# Patient Record
Sex: FEMALE | Race: WHITE | NOT HISPANIC OR LATINO | Employment: FULL TIME | URBAN - METROPOLITAN AREA
[De-identification: names, ages, dates, MRNs, and addresses within clinical notes are randomized per-mention and may not be internally consistent; named-entity substitution may affect disease eponyms.]

---

## 2023-05-11 ENCOUNTER — PATIENT ROUNDING (BHMG ONLY) (OUTPATIENT)
Dept: INTERNAL MEDICINE | Age: 48
End: 2023-05-11
Payer: COMMERCIAL

## 2023-05-11 ENCOUNTER — OFFICE VISIT (OUTPATIENT)
Dept: INTERNAL MEDICINE | Age: 48
End: 2023-05-11
Payer: COMMERCIAL

## 2023-05-11 VITALS
BODY MASS INDEX: 46.33 KG/M2 | OXYGEN SATURATION: 96 % | DIASTOLIC BLOOD PRESSURE: 82 MMHG | TEMPERATURE: 98.4 F | SYSTOLIC BLOOD PRESSURE: 116 MMHG | HEIGHT: 61 IN | WEIGHT: 245.4 LBS | HEART RATE: 72 BPM

## 2023-05-11 DIAGNOSIS — Z00.00 ENCOUNTER FOR MEDICAL EXAMINATION TO ESTABLISH CARE: ICD-10-CM

## 2023-05-11 DIAGNOSIS — I10 PRIMARY HYPERTENSION: ICD-10-CM

## 2023-05-11 DIAGNOSIS — M51.16 RADICULOPATHY DUE TO DISORDER OF INTERVERTEBRAL DISC OF LUMBAR SPINE: ICD-10-CM

## 2023-05-11 DIAGNOSIS — E55.9 VITAMIN D DEFICIENCY: ICD-10-CM

## 2023-05-11 DIAGNOSIS — F33.0 MILD EPISODE OF RECURRENT MAJOR DEPRESSIVE DISORDER: ICD-10-CM

## 2023-05-11 DIAGNOSIS — F31.9 BIPOLAR 1 DISORDER: ICD-10-CM

## 2023-05-11 DIAGNOSIS — L60.0 INGROWING NAIL, RIGHT GREAT TOE: Primary | ICD-10-CM

## 2023-05-11 DIAGNOSIS — E66.01 MORBID OBESITY WITH BMI OF 45.0-49.9, ADULT: ICD-10-CM

## 2023-05-11 DIAGNOSIS — J30.2 SEASONAL ALLERGIES: ICD-10-CM

## 2023-05-11 PROBLEM — F32.9 MAJOR DEPRESSIVE DISORDER: Status: ACTIVE | Noted: 2023-05-11

## 2023-05-11 RX ORDER — ERGOCALCIFEROL 1.25 MG/1
50000 CAPSULE ORAL WEEKLY
COMMUNITY

## 2023-05-11 RX ORDER — BENAZEPRIL HYDROCHLORIDE 5 MG/1
40 TABLET, FILM COATED ORAL DAILY
COMMUNITY

## 2023-05-11 RX ORDER — DICLOFENAC SODIUM 75 MG/1
TABLET, DELAYED RELEASE ORAL
COMMUNITY
Start: 2023-05-01

## 2023-05-11 RX ORDER — HYDROCHLOROTHIAZIDE 12.5 MG/1
12.5 CAPSULE, GELATIN COATED ORAL DAILY
COMMUNITY

## 2023-05-11 RX ORDER — VENLAFAXINE 25 MG/1
25 TABLET ORAL 2 TIMES DAILY
COMMUNITY

## 2023-05-11 RX ORDER — MONTELUKAST SODIUM 10 MG/1
10 TABLET ORAL NIGHTLY
COMMUNITY

## 2023-05-11 RX ORDER — AMLODIPINE BESYLATE 10 MG/1
TABLET ORAL
COMMUNITY
Start: 2023-05-01

## 2023-05-11 RX ORDER — GABAPENTIN 400 MG/1
CAPSULE ORAL
COMMUNITY
Start: 2023-05-01

## 2023-05-11 NOTE — PROGRESS NOTES
I N T E R N A L  M E D I C I N E  Stefanie Oscar, APRN    ENCOUNTER DATE:  05/11/2023    Ana Reddy / 48 y.o. / female      CHIEF COMPLAINT / REASON FOR OFFICE VISIT     Establish Care, Toe Pain (Right big toe, nail has been removed prior but has grown back the same way, nail is growing into second toe), Back Pain, and Leg Pain      ASSESSMENT & PLAN     Diagnoses and all orders for this visit:    1. Ingrowing nail, right great toe (Primary)  Assessment & Plan:  Referral to Podiatry for trimming       2. Radiculopathy due to disorder of intervertebral disc of lumbar spine  Assessment & Plan:  Patient on Gabapentin 400 mg daily for peripheral neuropathy, and currently filled and prescribed by her Pain MD in [place]. Referral to Pain Management here in [place]    Orders:  -     Ambulatory Referral to Pain Management Clinic    3. Primary hypertension  Assessment & Plan:  Hypertension is stable. Continue Amlodipine 10 mg daily, Benzaepril 40 mg daily, and hydrochlorothiazide 12.5 mg daily   Continue to loose weight, exercise at least 30 minutes 3 days a week, and make healthy food choices.     Orders:  -     CBC w AUTO Differential  -     Comprehensive metabolic panel    4. Bipolar 1 disorder  Assessment & Plan:  Continue Venlafaxine 25 mg twice daily and prescribed and managed by her Psychiatrist in [place]      5. Vitamin D deficiency  Assessment & Plan:  Continue Vitamin D 50,000 units once weekly      6. Mild episode of recurrent major depressive disorder  -     Ambulatory Referral to Psychiatry    7. Morbid obesity with BMI of 45.0-49.9, adult  Assessment & Plan:  Patient's (Body mass index is 46.37 kg/m².) indicates that they are morbidly/severely obese (BMI > 40 or > 35 with obesity - related health condition) with health conditions that include hypertension . Weight is newly identified. BMI  is above average; BMI management plan is completed. We discussed low calorie, low carb based diet program,  "portion control, increasing exercise, joining a fitness center or start home based exercise program and Weight Watchers or other Commercial based weight reduction program.     Orders:  -     Lipid panel  -     Hemoglobin A1c  -     TSH+Free T4    8. Seasonal allergies  Assessment & Plan:  Continue Montelukast 10 mg daily      9. Encounter for medical examination to establish care  -     Ambulatory Referral to Obstetrics / Gynecology  -     Ambulatory Referral to Podiatry      Return in about 6 months (around 11/11/2023) for Next scheduled follow up.      VITAL SIGNS     Visit Vitals  /82 (BP Location: Left arm, Patient Position: Sitting, Cuff Size: Large Adult)   Pulse 72   Temp 98.4 °F (36.9 °C) (Temporal)   Ht 154.9 cm (61\")   Wt 111 kg (245 lb 6.4 oz)   SpO2 96%   BMI 46.37 kg/m²           BP Readings from Last 3 Encounters:   05/11/23 116/82     Wt Readings from Last 3 Encounters:   05/11/23 111 kg (245 lb 6.4 oz)     Body mass index is 46.37 kg/m².    Blood pressure readings recorded on patient flowsheet:       View : No data to display.                     MEDICATIONS AT THE TIME OF OFFICE VISIT     Current Outpatient Medications on File Prior to Visit   Medication Sig Dispense Refill   • amLODIPine (NORVASC) 10 MG tablet      • benazepril (LOTENSIN) 5 MG tablet Take 8 tablets by mouth Daily.     • Cetirizine HCl 10 MG capsule Take 10 mg by mouth Daily.     • diclofenac (VOLTAREN) 75 MG EC tablet      • gabapentin (NEURONTIN) 400 MG capsule      • hydroCHLOROthiazide (MICROZIDE) 12.5 MG capsule Take 1 capsule by mouth Daily.     • montelukast (SINGULAIR) 10 MG tablet Take 1 tablet by mouth Every Night.     • venlafaxine (EFFEXOR) 25 MG tablet Take 1 tablet by mouth 2 (Two) Times a Day.     • vitamin D (ERGOCALCIFEROL) 1.25 MG (87332 UT) capsule capsule Take 1 capsule by mouth 1 (One) Time Per Week.       No current facility-administered medications on file prior to visit.        HISTORY OF PRESENT " ILLNESS   48 year old female being seen today to establish care with a New PCP, former PCP Dr Lam Sims, in [place], and [place] PCP [facility]. No previous medical records available to review. PMH includes Seasonal allergies, Hypertension, Vitamin D Deficiency, Morbid Obesity, Bipolar 1 Disorder, Major Depressive disorder, Radiculopathy due to disorder of intervertebral disc of lumbar spine, Ingrowing nail, right great toe.   Patient states that she had her right great toenail removed previously and now it has regrown sideways and rubbing against her second toe causing some discomfort but no open areas noted. Patient states that she would like a referral to a Podiatrist for treatment.   Patient states has chronic lumbar radiculopathy related to bulging discs and does not want back surgery. Patient is currently on Gabapentin and states has been helping and requesting referral to Pain management for treatment.   Hypertension is stable on current medication regimen. Patient states that she is compliant with her medications. 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   Bipolar is a chronic disorder and currently stable on Venlafaxine. Patient states that she had a Psychiatrist in [place] and would like one here.     Health Maintenance: Patient due for her annual mammogram, states had a hysterectomy. Patient also due for TDap.       Patient Care Team:  Stefanie Oscar APRN as PCP - General (Family Medicine)    REVIEW OF SYSTEMS     Review of Systems   Constitutional: Negative for activity change, appetite change, chills and fever.   Respiratory: Negative for cough, chest tightness and shortness of breath.    Cardiovascular: Negative for chest pain, palpitations and leg swelling.   Gastrointestinal: Negative for abdominal distention, abdominal pain, constipation, diarrhea, nausea and vomiting.    Musculoskeletal: Positive for arthralgias and myalgias.   Neurological: Negative for dizziness and headaches.   Psychiatric/Behavioral: Negative.  Negative for self-injury and suicidal ideas.          PHYSICAL EXAMINATION     Physical Exam  Constitutional:       General: She is not in acute distress.  Cardiovascular:      Rate and Rhythm: Normal rate and regular rhythm.      Pulses: Normal pulses.      Heart sounds: Normal heart sounds.   Pulmonary:      Effort: Pulmonary effort is normal. No respiratory distress.      Breath sounds: Normal breath sounds.   Abdominal:      General: Bowel sounds are normal. There is no distension.      Palpations: Abdomen is soft.      Tenderness: There is no abdominal tenderness.   Musculoskeletal:         General: No swelling or tenderness. Normal range of motion.      Cervical back: Normal range of motion and neck supple. No rigidity or tenderness.      Comments: Numbness and tingling in bilateral extremities with mild lower lumbar tenderness    Skin:     General: Skin is warm and dry.   Neurological:      Mental Status: She is alert and oriented to person, place, and time. Mental status is at baseline.      Cranial Nerves: No cranial nerve deficit.   Psychiatric:         Mood and Affect: Mood normal.         Behavior: Behavior normal.         Thought Content: Thought content normal.         Judgment: Judgment normal.             REVIEWED DATA     Labs:     No results found for: NA, K, CALCIUM, AST, ALT, BUN, CREATININE, EGFRIFNONA, EGFRIFAFRI    No results found for: HGBA1C    No results found for: LDL, HDL, TRIG    No results found for: TSH, FREET4    No results found for: WBC, HGB, PLT    No results found for: MALBCRERATIO         Imaging:           Medical Tests:           Summary of old records / correspondence / consultant report:           Request outside records:           MARGARET Ni

## 2023-05-11 NOTE — PROGRESS NOTES
A My-Chart message has been sent to the patient [place] [facility] [place]

## 2023-05-11 NOTE — ASSESSMENT & PLAN NOTE
Patient's (Body mass index is 46.37 kg/m².) indicates that they are morbidly/severely obese (BMI > 40 or > 35 with obesity - related health condition) with health conditions that include hypertension . Weight is newly identified. BMI  is above average; BMI management plan is completed. We discussed low calorie, low carb based diet program, portion control, increasing exercise, joining a fitness center or start home based exercise program and Weight Watchers or other Commercial based weight reduction program.

## 2023-05-11 NOTE — ASSESSMENT & PLAN NOTE
Hypertension is stable. Continue Amlodipine 10 mg daily, Benzaepril 40 mg daily, and hydrochlorothiazide 12.5 mg daily   Continue to loose weight, exercise at least 30 minutes 3 days a week, and make healthy food choices.

## 2023-05-11 NOTE — ASSESSMENT & PLAN NOTE
Patient on Gabapentin 400 mg daily for peripheral neuropathy, and currently filled and prescribed by her Pain MD in [place]. Referral to Pain Management here in [place]

## 2023-05-12 LAB
ALBUMIN SERPL-MCNC: 4.2 G/DL (ref 3.5–5.2)
ALBUMIN/GLOB SERPL: 1.5 G/DL
ALP SERPL-CCNC: 66 U/L (ref 39–117)
ALT SERPL-CCNC: 20 U/L (ref 1–33)
AST SERPL-CCNC: 19 U/L (ref 1–32)
BASOPHILS # BLD AUTO: 0.07 10*3/MM3 (ref 0–0.2)
BASOPHILS NFR BLD AUTO: 1.2 % (ref 0–1.5)
BILIRUB SERPL-MCNC: 0.2 MG/DL (ref 0–1.2)
BUN SERPL-MCNC: 20 MG/DL (ref 6–20)
BUN/CREAT SERPL: 25 (ref 7–25)
CALCIUM SERPL-MCNC: 9.4 MG/DL (ref 8.6–10.5)
CHLORIDE SERPL-SCNC: 101 MMOL/L (ref 98–107)
CHOLEST SERPL-MCNC: 208 MG/DL (ref 0–200)
CO2 SERPL-SCNC: 28.4 MMOL/L (ref 22–29)
CREAT SERPL-MCNC: 0.8 MG/DL (ref 0.57–1)
EGFRCR SERPLBLD CKD-EPI 2021: 90.5 ML/MIN/1.73
EOSINOPHIL # BLD AUTO: 0.33 10*3/MM3 (ref 0–0.4)
EOSINOPHIL NFR BLD AUTO: 5.7 % (ref 0.3–6.2)
ERYTHROCYTE [DISTWIDTH] IN BLOOD BY AUTOMATED COUNT: 13.6 % (ref 12.3–15.4)
GLOBULIN SER CALC-MCNC: 2.8 GM/DL
GLUCOSE SERPL-MCNC: 103 MG/DL (ref 65–99)
HBA1C MFR BLD: 5.7 % (ref 4.8–5.6)
HCT VFR BLD AUTO: 34.5 % (ref 34–46.6)
HDLC SERPL-MCNC: 43 MG/DL (ref 40–60)
HGB BLD-MCNC: 11.6 G/DL (ref 12–15.9)
IMM GRANULOCYTES # BLD AUTO: 0.01 10*3/MM3 (ref 0–0.05)
IMM GRANULOCYTES NFR BLD AUTO: 0.2 % (ref 0–0.5)
LDLC SERPL CALC-MCNC: 145 MG/DL (ref 0–100)
LYMPHOCYTES # BLD AUTO: 1.55 10*3/MM3 (ref 0.7–3.1)
LYMPHOCYTES NFR BLD AUTO: 26.9 % (ref 19.6–45.3)
MCH RBC QN AUTO: 29.4 PG (ref 26.6–33)
MCHC RBC AUTO-ENTMCNC: 33.6 G/DL (ref 31.5–35.7)
MCV RBC AUTO: 87.3 FL (ref 79–97)
MONOCYTES # BLD AUTO: 0.49 10*3/MM3 (ref 0.1–0.9)
MONOCYTES NFR BLD AUTO: 8.5 % (ref 5–12)
NEUTROPHILS # BLD AUTO: 3.32 10*3/MM3 (ref 1.7–7)
NEUTROPHILS NFR BLD AUTO: 57.5 % (ref 42.7–76)
NRBC BLD AUTO-RTO: 0 /100 WBC (ref 0–0.2)
PLATELET # BLD AUTO: 307 10*3/MM3 (ref 140–450)
POTASSIUM SERPL-SCNC: 4 MMOL/L (ref 3.5–5.2)
PROT SERPL-MCNC: 7 G/DL (ref 6–8.5)
RBC # BLD AUTO: 3.95 10*6/MM3 (ref 3.77–5.28)
SODIUM SERPL-SCNC: 139 MMOL/L (ref 136–145)
T4 FREE SERPL-MCNC: 1.13 NG/DL (ref 0.93–1.7)
TRIGL SERPL-MCNC: 112 MG/DL (ref 0–150)
TSH SERPL DL<=0.005 MIU/L-ACNC: 3.21 UIU/ML (ref 0.27–4.2)
VLDLC SERPL CALC-MCNC: 20 MG/DL (ref 5–40)
WBC # BLD AUTO: 5.77 10*3/MM3 (ref 3.4–10.8)

## 2023-05-17 DIAGNOSIS — M51.16 RADICULOPATHY DUE TO DISORDER OF INTERVERTEBRAL DISC OF LUMBAR SPINE: Primary | ICD-10-CM

## 2023-05-17 RX ORDER — ATORVASTATIN CALCIUM 10 MG/1
10 TABLET, FILM COATED ORAL DAILY
Qty: 90 TABLET | Refills: 2 | Status: ON HOLD | OUTPATIENT
Start: 2023-05-17

## 2023-05-17 RX ORDER — METFORMIN HYDROCHLORIDE 500 MG/1
500 TABLET, EXTENDED RELEASE ORAL
Qty: 90 TABLET | Refills: 2 | Status: ON HOLD | OUTPATIENT
Start: 2023-05-17

## 2023-05-22 ENCOUNTER — HOSPITAL ENCOUNTER (OUTPATIENT)
Facility: HOSPITAL | Age: 48
Setting detail: OBSERVATION
Discharge: HOME | End: 2023-05-23
Attending: EMERGENCY MEDICINE | Admitting: EMERGENCY MEDICINE
Payer: COMMERCIAL

## 2023-05-22 ENCOUNTER — APPOINTMENT (OUTPATIENT)
Dept: GENERAL RADIOLOGY | Facility: HOSPITAL | Age: 48
End: 2023-05-22
Payer: COMMERCIAL

## 2023-05-22 DIAGNOSIS — E66.9 OBESITY, UNSPECIFIED CLASSIFICATION, UNSPECIFIED OBESITY TYPE, UNSPECIFIED WHETHER SERIOUS COMORBIDITY PRESENT: ICD-10-CM

## 2023-05-22 DIAGNOSIS — Z86.39 HISTORY OF DIABETES MELLITUS: ICD-10-CM

## 2023-05-22 DIAGNOSIS — I10 ELEVATED BLOOD PRESSURE READING IN OFFICE WITH DIAGNOSIS OF HYPERTENSION: ICD-10-CM

## 2023-05-22 DIAGNOSIS — I20.8 ANGINAL EQUIVALENT: Primary | ICD-10-CM

## 2023-05-22 PROBLEM — R07.89 CHEST DISCOMFORT: Status: ACTIVE | Noted: 2023-05-22

## 2023-05-22 LAB
ALBUMIN SERPL-MCNC: 4.4 G/DL (ref 3.5–5.2)
ALBUMIN/GLOB SERPL: 1.4 G/DL
ALP SERPL-CCNC: 74 U/L (ref 39–117)
ALT SERPL W P-5'-P-CCNC: 23 U/L (ref 1–33)
AMORPH URATE CRY URNS QL MICRO: NORMAL /HPF
ANION GAP SERPL CALCULATED.3IONS-SCNC: 9.6 MMOL/L (ref 5–15)
AST SERPL-CCNC: 19 U/L (ref 1–32)
BACTERIA UR QL AUTO: NORMAL /HPF
BASOPHILS # BLD AUTO: 0.06 10*3/MM3 (ref 0–0.2)
BASOPHILS NFR BLD AUTO: 1 % (ref 0–1.5)
BILIRUB SERPL-MCNC: 0.2 MG/DL (ref 0–1.2)
BILIRUB UR QL STRIP: NEGATIVE
BUN SERPL-MCNC: 20 MG/DL (ref 6–20)
BUN/CREAT SERPL: 23 (ref 7–25)
CALCIUM SPEC-SCNC: 9.6 MG/DL (ref 8.6–10.5)
CHLORIDE SERPL-SCNC: 102 MMOL/L (ref 98–107)
CLARITY UR: CLEAR
CO2 SERPL-SCNC: 26.4 MMOL/L (ref 22–29)
COLOR UR: YELLOW
CREAT SERPL-MCNC: 0.87 MG/DL (ref 0.57–1)
DEPRECATED RDW RBC AUTO: 42.8 FL (ref 37–54)
EGFRCR SERPLBLD CKD-EPI 2021: 81.8 ML/MIN/1.73
EOSINOPHIL # BLD AUTO: 0.23 10*3/MM3 (ref 0–0.4)
EOSINOPHIL NFR BLD AUTO: 3.7 % (ref 0.3–6.2)
ERYTHROCYTE [DISTWIDTH] IN BLOOD BY AUTOMATED COUNT: 13.5 % (ref 12.3–15.4)
GLOBULIN UR ELPH-MCNC: 3.2 GM/DL
GLUCOSE SERPL-MCNC: 113 MG/DL (ref 65–99)
GLUCOSE UR STRIP-MCNC: NEGATIVE MG/DL
HCT VFR BLD AUTO: 33.4 % (ref 34–46.6)
HGB BLD-MCNC: 11.2 G/DL (ref 12–15.9)
HGB UR QL STRIP.AUTO: ABNORMAL
HOLD SPECIMEN: NORMAL
HOLD SPECIMEN: NORMAL
HYALINE CASTS UR QL AUTO: NORMAL /LPF
IMM GRANULOCYTES # BLD AUTO: 0.01 10*3/MM3 (ref 0–0.05)
IMM GRANULOCYTES NFR BLD AUTO: 0.2 % (ref 0–0.5)
KETONES UR QL STRIP: NEGATIVE
LEUKOCYTE ESTERASE UR QL STRIP.AUTO: NEGATIVE
LYMPHOCYTES # BLD AUTO: 1.51 10*3/MM3 (ref 0.7–3.1)
LYMPHOCYTES NFR BLD AUTO: 24.2 % (ref 19.6–45.3)
MAGNESIUM SERPL-MCNC: 1.8 MG/DL (ref 1.6–2.6)
MCH RBC QN AUTO: 29 PG (ref 26.6–33)
MCHC RBC AUTO-ENTMCNC: 33.5 G/DL (ref 31.5–35.7)
MCV RBC AUTO: 86.5 FL (ref 79–97)
MONOCYTES # BLD AUTO: 0.39 10*3/MM3 (ref 0.1–0.9)
MONOCYTES NFR BLD AUTO: 6.3 % (ref 5–12)
NEUTROPHILS NFR BLD AUTO: 4.04 10*3/MM3 (ref 1.7–7)
NEUTROPHILS NFR BLD AUTO: 64.6 % (ref 42.7–76)
NITRITE UR QL STRIP: NEGATIVE
NRBC BLD AUTO-RTO: 0 /100 WBC (ref 0–0.2)
PH UR STRIP.AUTO: 5.5 [PH] (ref 5–8)
PLATELET # BLD AUTO: 325 10*3/MM3 (ref 140–450)
PMV BLD AUTO: 10 FL (ref 6–12)
POTASSIUM SERPL-SCNC: 3.6 MMOL/L (ref 3.5–5.2)
PROT SERPL-MCNC: 7.6 G/DL (ref 6–8.5)
PROT UR QL STRIP: NEGATIVE
QT INTERVAL: 405 MS
RBC # BLD AUTO: 3.86 10*6/MM3 (ref 3.77–5.28)
RBC # UR STRIP: NORMAL /HPF
REF LAB TEST METHOD: NORMAL
SODIUM SERPL-SCNC: 138 MMOL/L (ref 136–145)
SP GR UR STRIP: >=1.03 (ref 1–1.03)
SQUAMOUS #/AREA URNS HPF: NORMAL /HPF
TROPONIN T SERPL HS-MCNC: <6 NG/L
TROPONIN T SERPL HS-MCNC: <6 NG/L
UROBILINOGEN UR QL STRIP: ABNORMAL
WBC # UR STRIP: NORMAL /HPF
WBC NRBC COR # BLD: 6.24 10*3/MM3 (ref 3.4–10.8)
WHOLE BLOOD HOLD COAG: NORMAL
WHOLE BLOOD HOLD SPECIMEN: NORMAL

## 2023-05-22 PROCEDURE — 83735 ASSAY OF MAGNESIUM: CPT

## 2023-05-22 PROCEDURE — 84484 ASSAY OF TROPONIN QUANT: CPT

## 2023-05-22 PROCEDURE — 99284 EMERGENCY DEPT VISIT MOD MDM: CPT

## 2023-05-22 PROCEDURE — 80053 COMPREHEN METABOLIC PANEL: CPT

## 2023-05-22 PROCEDURE — 85025 COMPLETE CBC W/AUTO DIFF WBC: CPT

## 2023-05-22 PROCEDURE — 93005 ELECTROCARDIOGRAM TRACING: CPT

## 2023-05-22 PROCEDURE — 71045 X-RAY EXAM CHEST 1 VIEW: CPT

## 2023-05-22 PROCEDURE — G0378 HOSPITAL OBSERVATION PER HR: HCPCS

## 2023-05-22 PROCEDURE — 93010 ELECTROCARDIOGRAM REPORT: CPT | Performed by: INTERNAL MEDICINE

## 2023-05-22 PROCEDURE — 36415 COLL VENOUS BLD VENIPUNCTURE: CPT

## 2023-05-22 PROCEDURE — 81001 URINALYSIS AUTO W/SCOPE: CPT | Performed by: EMERGENCY MEDICINE

## 2023-05-22 PROCEDURE — 93005 ELECTROCARDIOGRAM TRACING: CPT | Performed by: EMERGENCY MEDICINE

## 2023-05-22 PROCEDURE — 84484 ASSAY OF TROPONIN QUANT: CPT | Performed by: STUDENT IN AN ORGANIZED HEALTH CARE EDUCATION/TRAINING PROGRAM

## 2023-05-22 RX ORDER — HYDROCHLOROTHIAZIDE 12.5 MG/1
12.5 TABLET ORAL DAILY
Status: DISCONTINUED | OUTPATIENT
Start: 2023-05-23 | End: 2023-05-23 | Stop reason: HOSPADM

## 2023-05-22 RX ORDER — BISACODYL 5 MG/1
5 TABLET, DELAYED RELEASE ORAL DAILY PRN
Status: DISCONTINUED | OUTPATIENT
Start: 2023-05-22 | End: 2023-05-23 | Stop reason: HOSPADM

## 2023-05-22 RX ORDER — ATORVASTATIN CALCIUM 10 MG/1
10 TABLET, FILM COATED ORAL DAILY
Status: DISCONTINUED | OUTPATIENT
Start: 2023-05-23 | End: 2023-05-23 | Stop reason: HOSPADM

## 2023-05-22 RX ORDER — ONDANSETRON 4 MG/1
4 TABLET, FILM COATED ORAL EVERY 6 HOURS PRN
Status: DISCONTINUED | OUTPATIENT
Start: 2023-05-22 | End: 2023-05-23 | Stop reason: HOSPADM

## 2023-05-22 RX ORDER — BISACODYL 10 MG
10 SUPPOSITORY, RECTAL RECTAL DAILY PRN
Status: DISCONTINUED | OUTPATIENT
Start: 2023-05-22 | End: 2023-05-23 | Stop reason: HOSPADM

## 2023-05-22 RX ORDER — NITROGLYCERIN 0.4 MG/1
0.4 TABLET SUBLINGUAL
Status: DISCONTINUED | OUTPATIENT
Start: 2023-05-22 | End: 2023-05-23 | Stop reason: HOSPADM

## 2023-05-22 RX ORDER — ACETAMINOPHEN 325 MG/1
650 TABLET ORAL EVERY 4 HOURS PRN
Status: DISCONTINUED | OUTPATIENT
Start: 2023-05-22 | End: 2023-05-23 | Stop reason: HOSPADM

## 2023-05-22 RX ORDER — AMLODIPINE BESYLATE 10 MG/1
10 TABLET ORAL DAILY
Status: DISCONTINUED | OUTPATIENT
Start: 2023-05-23 | End: 2023-05-23 | Stop reason: HOSPADM

## 2023-05-22 RX ORDER — POTASSIUM CHLORIDE 750 MG/1
10 TABLET, EXTENDED RELEASE ORAL DAILY
COMMUNITY

## 2023-05-22 RX ORDER — MONTELUKAST SODIUM 10 MG/1
10 TABLET ORAL NIGHTLY
Status: DISCONTINUED | OUTPATIENT
Start: 2023-05-23 | End: 2023-05-23 | Stop reason: HOSPADM

## 2023-05-22 RX ORDER — AMOXICILLIN 250 MG
2 CAPSULE ORAL 2 TIMES DAILY
Status: DISCONTINUED | OUTPATIENT
Start: 2023-05-23 | End: 2023-05-23 | Stop reason: HOSPADM

## 2023-05-22 RX ORDER — LISINOPRIL 10 MG/1
10 TABLET ORAL
Status: DISCONTINUED | OUTPATIENT
Start: 2023-05-23 | End: 2023-05-23 | Stop reason: HOSPADM

## 2023-05-22 RX ORDER — SODIUM CHLORIDE 9 MG/ML
40 INJECTION, SOLUTION INTRAVENOUS AS NEEDED
Status: DISCONTINUED | OUTPATIENT
Start: 2023-05-22 | End: 2023-05-23 | Stop reason: HOSPADM

## 2023-05-22 RX ORDER — SODIUM CHLORIDE 0.9 % (FLUSH) 0.9 %
10 SYRINGE (ML) INJECTION AS NEEDED
Status: DISCONTINUED | OUTPATIENT
Start: 2023-05-22 | End: 2023-05-23 | Stop reason: HOSPADM

## 2023-05-22 RX ORDER — SODIUM CHLORIDE 0.9 % (FLUSH) 0.9 %
10 SYRINGE (ML) INJECTION EVERY 12 HOURS SCHEDULED
Status: DISCONTINUED | OUTPATIENT
Start: 2023-05-23 | End: 2023-05-23 | Stop reason: HOSPADM

## 2023-05-22 RX ORDER — POLYETHYLENE GLYCOL 3350 17 G/17G
17 POWDER, FOR SOLUTION ORAL DAILY PRN
Status: DISCONTINUED | OUTPATIENT
Start: 2023-05-22 | End: 2023-05-23 | Stop reason: HOSPADM

## 2023-05-22 RX ORDER — POTASSIUM CHLORIDE 750 MG/1
10 TABLET, FILM COATED, EXTENDED RELEASE ORAL DAILY
Status: DISCONTINUED | OUTPATIENT
Start: 2023-05-23 | End: 2023-05-23 | Stop reason: HOSPADM

## 2023-05-22 RX ORDER — ONDANSETRON 2 MG/ML
4 INJECTION INTRAMUSCULAR; INTRAVENOUS EVERY 6 HOURS PRN
Status: DISCONTINUED | OUTPATIENT
Start: 2023-05-22 | End: 2023-05-23 | Stop reason: HOSPADM

## 2023-05-22 RX ORDER — GABAPENTIN 400 MG/1
400 CAPSULE ORAL EVERY 12 HOURS SCHEDULED
Status: DISCONTINUED | OUTPATIENT
Start: 2023-05-23 | End: 2023-05-23 | Stop reason: HOSPADM

## 2023-05-22 RX ORDER — CETIRIZINE HYDROCHLORIDE 10 MG/1
10 TABLET ORAL DAILY
Status: DISCONTINUED | OUTPATIENT
Start: 2023-05-23 | End: 2023-05-23 | Stop reason: HOSPADM

## 2023-05-22 RX ORDER — CHOLECALCIFEROL (VITAMIN D3) 125 MCG
5 CAPSULE ORAL NIGHTLY PRN
Status: DISCONTINUED | OUTPATIENT
Start: 2023-05-22 | End: 2023-05-23 | Stop reason: HOSPADM

## 2023-05-22 RX ORDER — HYDROXYZINE HYDROCHLORIDE 25 MG/1
25 TABLET, FILM COATED ORAL 3 TIMES DAILY PRN
Qty: 5 TABLET | Refills: 0 | Status: SHIPPED | OUTPATIENT
Start: 2023-05-22

## 2023-05-22 RX ADMIN — DOCUSATE SODIUM 50MG AND SENNOSIDES 8.6MG 2 TABLET: 8.6; 5 TABLET, FILM COATED ORAL at 23:33

## 2023-05-22 RX ADMIN — MONTELUKAST SODIUM 10 MG: 10 TABLET, FILM COATED ORAL at 23:33

## 2023-05-22 RX ADMIN — GABAPENTIN 400 MG: 400 CAPSULE ORAL at 23:33

## 2023-05-22 RX ADMIN — Medication 10 ML: at 23:34

## 2023-05-22 NOTE — ED NOTES
"Patient c/o right arm and right shoulder pain that has been intermittent since earlier today. Also does \"not feel right\" c/o lightheadedness.  "

## 2023-05-23 ENCOUNTER — READMISSION MANAGEMENT (OUTPATIENT)
Dept: CALL CENTER | Facility: HOSPITAL | Age: 48
End: 2023-05-23
Payer: COMMERCIAL

## 2023-05-23 ENCOUNTER — HOSPITAL ENCOUNTER (OUTPATIENT)
Dept: CARDIOLOGY | Facility: HOSPITAL | Age: 48
Discharge: HOME | End: 2023-05-23
Payer: COMMERCIAL

## 2023-05-23 VITALS
HEART RATE: 62 BPM | DIASTOLIC BLOOD PRESSURE: 83 MMHG | OXYGEN SATURATION: 96 % | RESPIRATION RATE: 16 BRPM | SYSTOLIC BLOOD PRESSURE: 145 MMHG | BODY MASS INDEX: 47.37 KG/M2 | TEMPERATURE: 97.7 F | WEIGHT: 250.9 LBS | HEIGHT: 61 IN

## 2023-05-23 VITALS — BODY MASS INDEX: 47.45 KG/M2 | WEIGHT: 251.32 LBS | HEIGHT: 61 IN

## 2023-05-23 DIAGNOSIS — R07.9 CHEST PAIN, UNSPECIFIED TYPE: ICD-10-CM

## 2023-05-23 DIAGNOSIS — R07.9 CHEST PAIN, UNSPECIFIED TYPE: Primary | ICD-10-CM

## 2023-05-23 PROBLEM — R07.89 CHEST DISCOMFORT: Status: RESOLVED | Noted: 2023-05-22 | Resolved: 2023-05-23

## 2023-05-23 LAB
ANION GAP SERPL CALCULATED.3IONS-SCNC: 10.9 MMOL/L (ref 5–15)
BH CV NUCLEAR PRIOR STUDY: 2
BH CV REST NUCLEAR ISOTOPE DOSE: 38.4 MCI
BH CV STRESS BP STAGE 1: NORMAL
BH CV STRESS COMMENTS STAGE 1: NORMAL
BH CV STRESS DOSE REGADENOSON STAGE 1: 0.4
BH CV STRESS DURATION MIN STAGE 1: 0
BH CV STRESS DURATION SEC STAGE 1: 10
BH CV STRESS HR STAGE 1: 84
BH CV STRESS NUCLEAR ISOTOPE DOSE: 38.4 MCI
BH CV STRESS PROTOCOL 1: NORMAL
BH CV STRESS RECOVERY BP: NORMAL MMHG
BH CV STRESS RECOVERY HR: 66 BPM
BH CV STRESS STAGE 1: 1
BUN SERPL-MCNC: 16 MG/DL (ref 6–20)
BUN/CREAT SERPL: 25.8 (ref 7–25)
CALCIUM SPEC-SCNC: 8.8 MG/DL (ref 8.6–10.5)
CHLORIDE SERPL-SCNC: 105 MMOL/L (ref 98–107)
CHOLEST SERPL-MCNC: 124 MG/DL (ref 0–200)
CO2 SERPL-SCNC: 25.1 MMOL/L (ref 22–29)
CREAT SERPL-MCNC: 0.62 MG/DL (ref 0.57–1)
DEPRECATED RDW RBC AUTO: 43.3 FL (ref 37–54)
EGFRCR SERPLBLD CKD-EPI 2021: 109.3 ML/MIN/1.73
ERYTHROCYTE [DISTWIDTH] IN BLOOD BY AUTOMATED COUNT: 13.6 % (ref 12.3–15.4)
GLUCOSE SERPL-MCNC: 95 MG/DL (ref 65–99)
HCT VFR BLD AUTO: 31.5 % (ref 34–46.6)
HDLC SERPL-MCNC: 43 MG/DL (ref 40–60)
HGB BLD-MCNC: 10.4 G/DL (ref 12–15.9)
LDLC SERPL CALC-MCNC: 62 MG/DL (ref 0–100)
LDLC/HDLC SERPL: 1.4 {RATIO}
LV EF NUC BP: 66 %
MAXIMAL PREDICTED HEART RATE: 171 BPM
MCH RBC QN AUTO: 28.7 PG (ref 26.6–33)
MCHC RBC AUTO-ENTMCNC: 33 G/DL (ref 31.5–35.7)
MCV RBC AUTO: 86.8 FL (ref 79–97)
PERCENT MAX PREDICTED HR: 49.12 %
PLATELET # BLD AUTO: 281 10*3/MM3 (ref 140–450)
PMV BLD AUTO: 10.5 FL (ref 6–12)
POTASSIUM SERPL-SCNC: 3.8 MMOL/L (ref 3.5–5.2)
QT INTERVAL: 448 MS
RBC # BLD AUTO: 3.63 10*6/MM3 (ref 3.77–5.28)
SODIUM SERPL-SCNC: 141 MMOL/L (ref 136–145)
STRESS BASELINE BP: NORMAL MMHG
STRESS BASELINE HR: 82 BPM
STRESS PERCENT HR: 58 %
STRESS POST EXERCISE DUR SEC: 10 SEC
STRESS POST PEAK BP: NORMAL MMHG
STRESS POST PEAK HR: 84 BPM
STRESS TARGET HR: 145 BPM
TRIGL SERPL-MCNC: 104 MG/DL (ref 0–150)
TROPONIN T SERPL HS-MCNC: 6 NG/L
VLDLC SERPL-MCNC: 19 MG/DL (ref 5–40)
WBC NRBC COR # BLD: 5.59 10*3/MM3 (ref 3.4–10.8)

## 2023-05-23 PROCEDURE — 85027 COMPLETE CBC AUTOMATED: CPT | Performed by: STUDENT IN AN ORGANIZED HEALTH CARE EDUCATION/TRAINING PROGRAM

## 2023-05-23 PROCEDURE — 93005 ELECTROCARDIOGRAM TRACING: CPT | Performed by: STUDENT IN AN ORGANIZED HEALTH CARE EDUCATION/TRAINING PROGRAM

## 2023-05-23 PROCEDURE — 99204 OFFICE O/P NEW MOD 45 MIN: CPT | Performed by: INTERNAL MEDICINE

## 2023-05-23 PROCEDURE — 0 RUBIDIUM CHLORIDE: Performed by: INTERNAL MEDICINE

## 2023-05-23 PROCEDURE — 25010000002 REGADENOSON 0.4 MG/5ML SOLUTION: Performed by: INTERNAL MEDICINE

## 2023-05-23 PROCEDURE — G0378 HOSPITAL OBSERVATION PER HR: HCPCS

## 2023-05-23 PROCEDURE — 93017 CV STRESS TEST TRACING ONLY: CPT

## 2023-05-23 PROCEDURE — 80048 BASIC METABOLIC PNL TOTAL CA: CPT | Performed by: STUDENT IN AN ORGANIZED HEALTH CARE EDUCATION/TRAINING PROGRAM

## 2023-05-23 PROCEDURE — 80061 LIPID PANEL: CPT | Performed by: STUDENT IN AN ORGANIZED HEALTH CARE EDUCATION/TRAINING PROGRAM

## 2023-05-23 PROCEDURE — A9555 RB82 RUBIDIUM: HCPCS | Performed by: INTERNAL MEDICINE

## 2023-05-23 PROCEDURE — 84484 ASSAY OF TROPONIN QUANT: CPT | Performed by: STUDENT IN AN ORGANIZED HEALTH CARE EDUCATION/TRAINING PROGRAM

## 2023-05-23 PROCEDURE — 93010 ELECTROCARDIOGRAM REPORT: CPT | Performed by: INTERNAL MEDICINE

## 2023-05-23 PROCEDURE — 78492 MYOCRD IMG PET MLT RST&STRS: CPT

## 2023-05-23 RX ORDER — REGADENOSON 0.08 MG/ML
0.4 INJECTION, SOLUTION INTRAVENOUS
Status: COMPLETED | OUTPATIENT
Start: 2023-05-23 | End: 2023-05-23

## 2023-05-23 RX ORDER — ALBUTEROL SULFATE 0.63 MG/3ML
0.63 SOLUTION RESPIRATORY (INHALATION) EVERY 6 HOURS PRN
Status: DISCONTINUED | OUTPATIENT
Start: 2023-05-23 | End: 2023-05-23 | Stop reason: HOSPADM

## 2023-05-23 RX ADMIN — LISINOPRIL 10 MG: 10 TABLET ORAL at 12:12

## 2023-05-23 RX ADMIN — ASPIRIN 325 MG: 325 TABLET, COATED ORAL at 12:12

## 2023-05-23 RX ADMIN — Medication 10 ML: at 12:13

## 2023-05-23 RX ADMIN — REGADENOSON 0.4 MG: 0.08 INJECTION, SOLUTION INTRAVENOUS at 10:22

## 2023-05-23 RX ADMIN — CETIRIZINE HYDROCHLORIDE 10 MG: 10 TABLET ORAL at 12:12

## 2023-05-23 RX ADMIN — AMLODIPINE BESYLATE 10 MG: 10 TABLET ORAL at 12:12

## 2023-05-23 RX ADMIN — POTASSIUM CHLORIDE 10 MEQ: 750 TABLET, EXTENDED RELEASE ORAL at 12:12

## 2023-05-23 RX ADMIN — DOCUSATE SODIUM 50MG AND SENNOSIDES 8.6MG 2 TABLET: 8.6; 5 TABLET, FILM COATED ORAL at 12:13

## 2023-05-23 RX ADMIN — ATORVASTATIN CALCIUM 10 MG: 10 TABLET, FILM COATED ORAL at 12:13

## 2023-05-23 NOTE — CONSULTS
[facility] Consult    Patient Name: Ana Reddy  Age/Sex: 48 y.o. female  : 1975  MRN: [id number]    Date of Admission: 2023  Date of Encounter Visit: 23  Encounter Provider: Jacque Rao MD  Referring Provider: Sergei Gunderson MD  Place of Service: [facility] CARDIOLOGY  Patient Care Team:  Stefanie Oscar APRN as PCP - General (Family Medicine)    Subjective:     Consulted for: chest pain    Chief Complaint: chest pain    History of Present Illness:  Ana Reddy is a 48 y.o. female with hypertension, hyperlipidemia, diabetes mellitus type 2, who presented to the emergency room with complaints of chest pain.    The patient reports that when she woke up yesterday she was feeling unwell.  She began having episodes of chest discomfort lasting several minutes at a time.  Symptoms eventually improved and she was able to go on to work.  While at work she just found herself just moving a lot slower than normal.  She states that her coworkers felt that she looked pale and noted that she was moving slower also.  She reported onset of chest pressure yesterday afternoon that resolved after several minutes.  She had a recurrent episode of chest pressure after she got here but has not recurred since.  She denies any similar prior symptoms.  She reports that she was feeling well until yesterday.  She denies any family history of coronary artery disease.    Since her arrival to the emergency room her work-up has been unremarkable including completely normal troponins.  EKG shows nonspecific inferior T wave changes.      Past Medical History:  Past Medical History:   Diagnosis Date   • Allergic 3/1992   • Anxiety    • Arthritis    • Asthma    • Depression    • Headache    • Hypertension    • Low back pain        Past Surgical History:   Procedure Laterality Date   • CHOLECYSTECTOMY     • ELBOW / UPPER ARM FOREIGN BODY REMOVAL Right    •  TUBAL ABDOMINAL LIGATION         Home Medications:   Medications Prior to Admission   Medication Sig Dispense Refill Last Dose   • amLODIPine (NORVASC) 10 MG tablet Daily.   2023   • atorvastatin (LIPITOR) 10 MG tablet Take 1 tablet by mouth Daily. 90 tablet 2 2023   • benazepril (LOTENSIN) 5 MG tablet Take 2 tablets by mouth Daily.   2023   • Cetirizine HCl 10 MG capsule Take 10 mg by mouth Daily.   2023   • diclofenac (VOLTAREN) 75 MG EC tablet    2023   • gabapentin (NEURONTIN) 400 MG capsule    2023   • hydroCHLOROthiazide (MICROZIDE) 12.5 MG capsule Take 1 capsule by mouth Daily.   2023   • hydrOXYzine (ATARAX) 25 MG tablet Take 1 tablet by mouth 3 (Three) Times a Day As Needed for Itching or Anxiety. 5 tablet 0 Past Month   • metFORMIN ER (GLUCOPHAGE-XR) 500 MG 24 hr tablet Take 1 tablet by mouth Daily With Breakfast. 90 tablet 2 2023   • montelukast (SINGULAIR) 10 MG tablet Take 1 tablet by mouth Every Night.   2023   • potassium chloride (K-DUR,KLOR-CON) 10 MEQ CR tablet Take 1 tablet by mouth Daily.   2023   • venlafaxine (EFFEXOR) 25 MG tablet Take 1 tablet by mouth As Needed.   Past Week   • vitamin D (ERGOCALCIFEROL) 1.25 MG (71123 UT) capsule capsule Take 1 capsule by mouth 1 (One) Time Per Week.   Past Week       Allergies:  Allergies   Allergen Reactions   • Effexor [Venlafaxine] Dizziness   • Penicillins Rash       Past Social History:  Social History     Socioeconomic History   • Marital status: Significant Other   Tobacco Use   • Smoking status: Former     Packs/day: 0.25     Years: 1.00     Pack years: 0.25     Types: Cigarettes     Start date: 1992     Quit date: 2009     Years since quittin.0   • Smokeless tobacco: Never   Vaping Use   • Vaping Use: Never used   Substance and Sexual Activity   • Alcohol use: Yes     Alcohol/week: 2.0 standard drinks     Types: 1 Glasses of wine, 1 Drinks containing 0.5 oz of alcohol per week      Comment: socially   • Drug use: Not Currently     Types: Marijuana   • Sexual activity: Yes     Partners: Male     Birth control/protection: None       Past Family History:  Family History   Problem Relation Age of Onset   • Arthritis Mother    • Hypertension Mother    • Learning disabilities Mother    • Thyroid disease Mother    • Cancer Maternal Grandfather    • Depression Maternal Grandfather    • Mental illness Maternal Grandfather        Review of Systems:   All systems reviewed. Pertinent positives identified in HPI. All other systems are negative.    Objective:   Temp:  [97.1 °F (36.2 °C)-98 °F (36.7 °C)] 97.9 °F (36.6 °C)  Heart Rate:  [66-97] 70  Resp:  [16-18] 18  BP: ()/(62-88) 125/75   No intake or output data in the 24 hours ending 05/23/23 0716  Body mass index is 47.41 kg/m².      05/22/23  1755 05/22/23  2254   Weight: 111 kg (245 lb) 114 kg (250 lb 14.4 oz)     Weight change:     Physical Exam:   General Appearance:    Alert, cooperative, in no acute distress   Head:    Normocephalic, without obvious abnormality, atraumatic   Eyes:            Lids and lashes normal, conjunctivae and sclerae normal, no   icterus, no pallor, corneas clear, PERRLA   Ears:    Ears appear intact with no abnormalities noted   Neck:   No adenopathy, supple, trachea midline, no thyromegaly, no   carotid bruit, no JVD   Lungs:     Clear to auscultation,respirations regular, even and unlabored    Heart:    Regular rhythm and normal rate, normal S1 and S2, no murmur, no gallop, no rub, no click   Chest Wall:    No abnormalities observed   Abdomen:     Normal bowel sounds, no masses, no organomegaly, soft        non-tender, non-distended, no guarding, no rebound  tenderness   Extremities:   Moves all extremities well, no edema, no cyanosis, no redness   Pulses:   Pulses palpable and equal bilaterally. Normal radial, carotid, femoral, dorsalis pedis and posterior tibial pulses bilaterally. Normal abdominal aorta    Skin:  Psychiatric:   No bleeding, bruising or rash    Alert and oriented x 3, normal mood and affect       Lab Review:   Results from last 7 days   Lab Units 05/23/23  0426 05/22/23  1808   SODIUM mmol/L 141 138   POTASSIUM mmol/L 3.8 3.6   CHLORIDE mmol/L 105 102   CO2 mmol/L 25.1 26.4   BUN mg/dL 16 20   CREATININE mg/dL 0.62 0.87   GLUCOSE mg/dL 95 113*   CALCIUM mg/dL 8.8 9.6   AST (SGOT) U/L  --  19   ALT (SGPT) U/L  --  23     Results from last 7 days   Lab Units 05/22/23  2309 05/22/23  1808   HSTROP T ng/L <6 <6     Results from last 7 days   Lab Units 05/23/23  0426 05/22/23  1808   WBC 10*3/mm3 5.59 6.24   HEMOGLOBIN g/dL 10.4* 11.2*   HEMATOCRIT % 31.5* 33.4*   PLATELETS 10*3/mm3 281 325         Results from last 7 days   Lab Units 05/22/23  1808   MAGNESIUM mg/dL 1.8     Results from last 7 days   Lab Units 05/23/23  0426   CHOLESTEROL mg/dL 124   TRIGLYCERIDES mg/dL 104   HDL CHOL mg/dL 43               Echo EF Estimated  No results found for: ECHOEFEST    EKG:     Imaging:  Imaging Results (Most Recent)     Procedure Component Value Units Date/Time    XR Chest 1 View [[id number]] Collected: 05/22/23 1831     Updated: 05/22/23 1854    Narrative:      STAT PORTABLE RADIOGRAPHIC VIEW OF THE CHEST.     CLINICAL HISTORY: Weakness, dizziness, altered mental status.     FINDINGS:     The lungs are clear of acute infiltrates. The cardiomediastinal  silhouette is mildly enlarged. The osseous structures are incidentally  notable for degenerative phenomena within the thoracic spine.       Impression:         The cardiomediastinal silhouette is mildly enlarged. Otherwise, there is  no active disease in the chest.     This report was finalized on 5/22/2023 6:51 PM by Dr. Adrian Son M.D.             I personally viewed and interpreted the patient's EKG    Assessment/Plan:     1.  Chest pain.  Typical and atypical features.  Troponins are normal.  EKG with nonspecific inferior changes that are stable on serial  EKGs.  2.  Hypertension.  Well-controlled on current regimen medications.  3.  Diabetes mellitus type 2  4.  Hyperlipidemia  5.  Morbid obesity    - Discussed options with the patient.  We opted to proceed with further evaluation with stress test.  - Stress test looks okay she can be discharged home later today.    Thank you for allowing me to participate in the care of Ana Reddy. Feel free to contact me directly with any further questions or concerns.    Jacque Rao MD  [facility]  05/23/23  07:16 EDT

## 2023-05-23 NOTE — PROGRESS NOTES
ED OBSERVATION PROGRESS/DISCHARGE SUMMARY    Date of Admission: 5/22/2023   LOS: 0 days   PCP: Stefanie Oscar APRN    Subjective  Patient feeling well this afternoon, eager to go home.    Hospital Outcome: 48-year-old female admitted to the observation unit for further evaluation of chest pain.  High-sensitivity troponin has been negative, EKG shows inferior T wave inversions and lateral T wave flattening.  Patient was seen by cardiology, discussed with Dr. Rao.  Patient had stress test with PET myocardial perfusion in office that was consistent with low risk study.  Patient is safe for discharge home and she will follow-up with her primary care provider.  Usual return to ER precautions discussed, patient expresses understanding and is in agreement with plan.    ROS:  General: no fevers, chills  Respiratory: no cough, dyspnea  Cardiovascular: no chest pain, palpitations  Abdomen: No abdominal pain, nausea, vomiting, or diarrhea  Neurologic: No focal weakness    Objective   Physical Exam:  I have reviewed the vital signs.  Temp:  [97.1 °F (36.2 °C)-98 °F (36.7 °C)] 97.4 °F (36.3 °C)  Heart Rate:  [61-97] 61  Resp:  [16-18] 16  BP: ()/(62-88) 125/69  General Appearance:    Alert, cooperative, no distress  Head:    Normocephalic, atraumatic  Eyes:    Sclerae anicteric  Neck:   Supple, no mass  Lungs: Clear to auscultation bilaterally, respirations unlabored  Heart: Regular rate and rhythm, S1 and S2 normal, no murmur, rub or gallop  Abdomen:  Soft, non-tender, bowel sounds active, nondistended  Extremities: No clubbing, cyanosis, or edema to lower extremities  Pulses:  2+ and symmetric in distal lower extremities  Skin: No rashes   Neurologic: Oriented x3, Normal strength to extremities    Results Review:    I have reviewed the labs, radiology results and diagnostic studies.    Results from last 7 days   Lab Units 05/23/23  0426   WBC 10*3/mm3 5.59   HEMOGLOBIN g/dL 10.4*   HEMATOCRIT % 31.5*   PLATELETS  10*3/mm3 281     Results from last 7 days   Lab Units 05/23/23  0426 05/22/23  1808   SODIUM mmol/L 141 138   POTASSIUM mmol/L 3.8 3.6   CHLORIDE mmol/L 105 102   CO2 mmol/L 25.1 26.4   BUN mg/dL 16 20   CREATININE mg/dL 0.62 0.87   CALCIUM mg/dL 8.8 9.6   BILIRUBIN mg/dL  --  0.2   ALK PHOS U/L  --  74   ALT (SGPT) U/L  --  23   AST (SGOT) U/L  --  19   GLUCOSE mg/dL 95 113*     Imaging Results (Last 24 Hours)     Procedure Component Value Units Date/Time    XR Chest 1 View [[id number]] Collected: 05/22/23 1831     Updated: 05/22/23 1854    Narrative:      STAT PORTABLE RADIOGRAPHIC VIEW OF THE CHEST.     CLINICAL HISTORY: Weakness, dizziness, altered mental status.     FINDINGS:     The lungs are clear of acute infiltrates. The cardiomediastinal  silhouette is mildly enlarged. The osseous structures are incidentally  notable for degenerative phenomena within the thoracic spine.       Impression:         The cardiomediastinal silhouette is mildly enlarged. Otherwise, there is  no active disease in the chest.     This report was finalized on 5/22/2023 6:51 PM by Dr. Adrian Son M.D.           Stress test with PET myocardial perfusion 5/23/2023  Interpretation Summary       •  Findings consistent with a normal ECG stress test.  •  GI artifact is present.  •  Left ventricular ejection fraction is normal (Calculated EF = 66%).  •  Myocardial perfusion imaging indicates a normal myocardial perfusion study with no evidence of ischemia.  •  Impressions are consistent with a low risk study.  •  There is no prior study available for comparison.      I have reviewed the medications.  ---------------------------------------------------------------------------------------------  Assessment & Plan   Assessment/Problem List    Chest pain      Plan:    Chest  Pain  -High-sensitivity troponin negative x2, trend  -EKG shows T wave inversions inferiorly and T wave flattening laterally, no acute ischemia  -Chest x-ray negative  acute  -Cardiology consulted  -Aspirin/statin  -Nitro as needed for chest pain  -Telemetry monitoring  -Stress test today consistent with low risk study  -Discharge home, follow-up outpatient with primary care provider     Hypertension  -Continue amlodipine, hydrochlorothiazide, lisinopril  -Monitor with vital signs every 4 hours     Hyperlipidemia  -Continue statin     Asthma  -No acute exacerbation noted  -Albuterol as needed    Disposition: Home    Follow-up after Discharge: PCP    30 minutes has been spent by [facility] providers in the care of this patient while under observation status     This note will serve as discharge summary    EDMUND Pate 05/23/23 07:42 EDT    I have worn appropriate PPE during this patient encounter and sanitized my hands with entering and exiting patient room.

## 2023-05-23 NOTE — PLAN OF CARE
Problem: Adult Inpatient Plan of Care  Goal: Plan of Care Review  Outcome: Ongoing, Progressing  Flowsheets (Taken 5/23/2023 0644)  Progress: improving  Plan of Care Reviewed With: patient  Outcome Evaluation: Patient is a 48 year old female who is admitted to the observation unit for chest pain. Troponin has been 6. Patient is alert and oriented room air and ambulates to the bathroom independently. Patient is NPO. Cardialogist consulted. Continuing plan of care.  Goal: Patient-Specific Goal (Individualized)  Outcome: Ongoing, Progressing  Goal: Absence of Hospital-Acquired Illness or Injury  Outcome: Ongoing, Progressing  Intervention: Identify and Manage Fall Risk  Recent Flowsheet Documentation  Taken 5/23/2023 0600 by Chanel Watson, RN  Safety Promotion/Fall Prevention:   assistive device/personal items within reach   clutter free environment maintained   fall prevention program maintained   lighting adjusted   nonskid shoes/slippers when out of bed   room organization consistent   safety round/check completed  Taken 5/23/2023 0200 by Chanel Watson, RN  Safety Promotion/Fall Prevention:   assistive device/personal items within reach   clutter free environment maintained   fall prevention program maintained   lighting adjusted   nonskid shoes/slippers when out of bed   room organization consistent   safety round/check completed  Taken 5/23/2023 0000 by Chanel Watson, RN  Safety Promotion/Fall Prevention:   assistive device/personal items within reach   clutter free environment maintained   fall prevention program maintained   lighting adjusted   nonskid shoes/slippers when out of bed   room organization consistent   safety round/check completed  Taken 5/22/2023 2222 by Chanel Watson, RN  Safety Promotion/Fall Prevention:   assistive device/personal items within reach   clutter free environment maintained   fall prevention program maintained   lighting adjusted   nonskid shoes/slippers when out of bed   room  organization consistent   safety round/check completed  Intervention: Prevent Skin Injury  Recent Flowsheet Documentation  Taken 5/23/2023 0600 by Chanel Watson RN  Body Position: position changed independently  Taken 5/23/2023 0200 by Chanel Watson RN  Body Position: position changed independently  Taken 5/23/2023 0000 by Chanel Watson RN  Body Position: position changed independently  Taken 5/22/2023 2222 by Chanel aWtson RN  Body Position: position changed independently  Skin Protection: adhesive use limited  Intervention: Prevent and Manage VTE (Venous Thromboembolism) Risk  Recent Flowsheet Documentation  Taken 5/23/2023 0600 by Chanel Watson RN  Activity Management: activity minimized  Taken 5/23/2023 0200 by Chanel Watson RN  Activity Management: activity minimized  Taken 5/23/2023 0000 by Chanel Watson RN  Activity Management: activity minimized  Taken 5/22/2023 2222 by Chanel Watson RN  Activity Management: activity encouraged  VTE Prevention/Management: sequential compression devices off  Range of Motion: active ROM (range of motion) encouraged  Intervention: Prevent Infection  Recent Flowsheet Documentation  Taken 5/23/2023 0600 by Chanel Watson RN  Infection Prevention:   hand hygiene promoted   rest/sleep promoted   single patient room provided  Taken 5/23/2023 0200 by Chanel Watson RN  Infection Prevention:   hand hygiene promoted   single patient room provided   rest/sleep promoted  Taken 5/23/2023 0000 by Chanel Watson RN  Infection Prevention:   hand hygiene promoted   rest/sleep promoted   single patient room provided  Taken 5/22/2023 2222 by Chanel Watson RN  Infection Prevention:   hand hygiene promoted   single patient room provided  Goal: Optimal Comfort and Wellbeing  Outcome: Ongoing, Progressing  Intervention: Provide Person-Centered Care  Recent Flowsheet Documentation  Taken 5/22/2023 2222 by Chanel Watson RN  Trust Relationship/Rapport:   care explained   choices provided    questions answered   questions encouraged  Goal: Readiness for Transition of Care  Outcome: Ongoing, Progressing  Intervention: Mutually Develop Transition Plan  Recent Flowsheet Documentation  Taken 5/22/2023 2223 by Chanel Watson RN  Transportation Anticipated: family or friend will provide  Patient/Family Anticipated Services at Transition: none  Patient/Family Anticipates Transition to: home with family  Taken 5/22/2023 2220 by Chanel Watson RN  Equipment Currently Used at Home: none  Goal: Plan of Care Review  Outcome: Ongoing, Progressing  Flowsheets (Taken 5/23/2023 0644)  Progress: improving  Plan of Care Reviewed With: patient  Outcome Evaluation: Patient is a 48 year old female who is admitted to the observation unit for chest pain. Troponin has been 6. Patient is alert and oriented room air and ambulates to the bathroom independently. Patient is NPO. Cardialogist consulted. Continuing plan of care.  Goal: Patient-Specific Goal (Individualized)  Outcome: Ongoing, Progressing  Goal: Absence of Hospital-Acquired Illness or Injury  Outcome: Ongoing, Progressing  Intervention: Identify and Manage Fall Risk  Recent Flowsheet Documentation  Taken 5/23/2023 0600 by Chanel Watson RN  Safety Promotion/Fall Prevention:   assistive device/personal items within reach   clutter free environment maintained   fall prevention program maintained   lighting adjusted   nonskid shoes/slippers when out of bed   room organization consistent   safety round/check completed  Taken 5/23/2023 0200 by Chanel Watson RN  Safety Promotion/Fall Prevention:   assistive device/personal items within reach   clutter free environment maintained   fall prevention program maintained   lighting adjusted   nonskid shoes/slippers when out of bed   room organization consistent   safety round/check completed  Taken 5/23/2023 0000 by Chanel Watson RN  Safety Promotion/Fall Prevention:   assistive device/personal items within reach   clutter free  environment maintained   fall prevention program maintained   lighting adjusted   nonskid shoes/slippers when out of bed   room organization consistent   safety round/check completed  Taken 5/22/2023 2222 by Chanel Watson RN  Safety Promotion/Fall Prevention:   assistive device/personal items within reach   clutter free environment maintained   fall prevention program maintained   lighting adjusted   nonskid shoes/slippers when out of bed   room organization consistent   safety round/check completed  Intervention: Prevent Skin Injury  Recent Flowsheet Documentation  Taken 5/23/2023 0600 by Chanel Watson RN  Body Position: position changed independently  Taken 5/23/2023 0200 by Chanel Watson RN  Body Position: position changed independently  Taken 5/23/2023 0000 by Chanel Watson RN  Body Position: position changed independently  Taken 5/22/2023 2222 by Chanel Watson RN  Body Position: position changed independently  Skin Protection: adhesive use limited  Intervention: Prevent and Manage VTE (Venous Thromboembolism) Risk  Recent Flowsheet Documentation  Taken 5/23/2023 0600 by Chanel Watson RN  Activity Management: activity minimized  Taken 5/23/2023 0200 by Chanel Watson RN  Activity Management: activity minimized  Taken 5/23/2023 0000 by Chanel Watson RN  Activity Management: activity minimized  Taken 5/22/2023 2222 by Chanel Watson RN  Activity Management: activity encouraged  VTE Prevention/Management: sequential compression devices off  Range of Motion: active ROM (range of motion) encouraged  Intervention: Prevent Infection  Recent Flowsheet Documentation  Taken 5/23/2023 0600 by Chanel Watson RN  Infection Prevention:   hand hygiene promoted   rest/sleep promoted   single patient room provided  Taken 5/23/2023 0200 by Chanel Watson RN  Infection Prevention:   hand hygiene promoted   single patient room provided   rest/sleep promoted  Taken 5/23/2023 0000 by Chanel Watson RN  Infection Prevention:    hand hygiene promoted   rest/sleep promoted   single patient room provided  Taken 5/22/2023 2222 by Chanel Watson RN  Infection Prevention:   hand hygiene promoted   single patient room provided  Goal: Optimal Comfort and Wellbeing  Outcome: Ongoing, Progressing  Intervention: Provide Person-Centered Care  Recent Flowsheet Documentation  Taken 5/22/2023 2222 by Chanel Watson RN  Trust Relationship/Rapport:   care explained   choices provided   questions answered   questions encouraged  Goal: Readiness for Transition of Care  Outcome: Ongoing, Progressing  Intervention: Mutually Develop Transition Plan  Recent Flowsheet Documentation  Taken 5/22/2023 2223 by Chanel Watson RN  Transportation Anticipated: family or friend will provide  Patient/Family Anticipated Services at Transition: none  Patient/Family Anticipates Transition to: home with family  Taken 5/22/2023 2220 by Chanel Watson RN  Equipment Currently Used at Home: none     Problem: Chest Pain  Goal: Resolution of Chest Pain Symptoms  Outcome: Ongoing, Progressing   Goal Outcome Evaluation:  Plan of Care Reviewed With: patient        Progress: improving  Outcome Evaluation: Patient is a 48 year old female who is admitted to the observation unit for chest pain. Troponin has been 6. Patient is alert and oriented room air and ambulates to the bathroom independently. Patient is NPO. Cardialogist consulted. Continuing plan of care.

## 2023-05-23 NOTE — PLAN OF CARE
Problem: Adult Inpatient Plan of Care  Goal: Plan of Care Review  Outcome: Met  Flowsheets (Taken 5/23/2023 1332)  Progress: improving  Plan of Care Reviewed With: patient  Goal: Patient-Specific Goal (Individualized)  Outcome: Met  Goal: Absence of Hospital-Acquired Illness or Injury  Outcome: Met  Intervention: Identify and Manage Fall Risk  Recent Flowsheet Documentation  Taken 5/23/2023 1201 by Prabha Pierson RN  Safety Promotion/Fall Prevention:   safety round/check completed   room organization consistent  Taken 5/23/2023 1000 by Prabha Piersno RN  Safety Promotion/Fall Prevention:   safety round/check completed   patient off unit  Taken 5/23/2023 0800 by Prabha Pierson RN  Safety Promotion/Fall Prevention:   safety round/check completed   room organization consistent  Intervention: Prevent Skin Injury  Recent Flowsheet Documentation  Taken 5/23/2023 0800 by Prabha Pierson RN  Body Position: position changed independently  Skin Protection: adhesive use limited  Intervention: Prevent and Manage VTE (Venous Thromboembolism) Risk  Recent Flowsheet Documentation  Taken 5/23/2023 0800 by Prabha Pierson RN  Activity Management: activity minimized  VTE Prevention/Management: sequential compression devices off  Range of Motion: active ROM (range of motion) encouraged  Intervention: Prevent Infection  Recent Flowsheet Documentation  Taken 5/23/2023 1201 by Prabha Pierson RN  Infection Prevention:   rest/sleep promoted   single patient room provided  Goal: Optimal Comfort and Wellbeing  Outcome: Met  Intervention: Provide Person-Centered Care  Recent Flowsheet Documentation  Taken 5/23/2023 0800 by Prabha Pierson RN  Trust Relationship/Rapport: care explained  Goal: Readiness for Transition of Care  Outcome: Met  Goal: Plan of Care Review  Outcome: Met  Flowsheets (Taken 5/23/2023 1332)  Progress: improving  Plan of Care Reviewed With: patient  Goal: Patient-Specific Goal (Individualized)  Outcome:  Met  Goal: Absence of Hospital-Acquired Illness or Injury  Outcome: Met  Intervention: Identify and Manage Fall Risk  Recent Flowsheet Documentation  Taken 5/23/2023 1201 by Prabha Pierson RN  Safety Promotion/Fall Prevention:   safety round/check completed   room organization consistent  Taken 5/23/2023 1000 by Prabha Pierson RN  Safety Promotion/Fall Prevention:   safety round/check completed   patient off unit  Taken 5/23/2023 0800 by Prabha Pierson RN  Safety Promotion/Fall Prevention:   safety round/check completed   room organization consistent  Intervention: Prevent Skin Injury  Recent Flowsheet Documentation  Taken 5/23/2023 0800 by Prabha Pierson RN  Body Position: position changed independently  Skin Protection: adhesive use limited  Intervention: Prevent and Manage VTE (Venous Thromboembolism) Risk  Recent Flowsheet Documentation  Taken 5/23/2023 0800 by Prabha Pierson RN  Activity Management: activity minimized  VTE Prevention/Management: sequential compression devices off  Range of Motion: active ROM (range of motion) encouraged  Intervention: Prevent Infection  Recent Flowsheet Documentation  Taken 5/23/2023 1201 by Prabha Pierson RN  Infection Prevention:   rest/sleep promoted   single patient room provided  Goal: Optimal Comfort and Wellbeing  Outcome: Met  Intervention: Provide Person-Centered Care  Recent Flowsheet Documentation  Taken 5/23/2023 0800 by Prabha Pierson RN  Trust Relationship/Rapport: care explained  Goal: Readiness for Transition of Care  Outcome: Met     Problem: Chest Pain  Goal: Resolution of Chest Pain Symptoms  Outcome: Met   Goal Outcome Evaluation:  Plan of Care Reviewed With: patient      Pt has had an essential negative cardiac work up including troponin's and stress test. Pt to be discharged home with appropriate follow up. Pt agreeable to plan of care. Pt alert and orient times 4, NAD, up ad lucy, VSS. Discharge instructions will be provided.  Progress:  improving

## 2023-05-23 NOTE — OUTREACH NOTE
Prep Survey    Flowsheet Row Responses   [facility] facility patient discharged from? [facility]   Is LACE score < 7 ? Yes   Eligibility [facility]   Date of Admission 05/22/23   Date of Discharge 05/23/23   Discharge Disposition Home or Self Care   Discharge diagnosis Chest pain   Does the patient have one of the following disease processes/diagnoses(primary or secondary)? Other   Does the patient have Home health ordered? No   Is there a DME ordered? No   Prep survey completed? Yes          NIRANJAN LÓPEZ - Registered Nurse

## 2023-05-23 NOTE — H&P
[facility]   HISTORY AND PHYSICAL    Patient Name: Ana Reddy  : 1975  MRN: [id number]  Primary Care Physician:  Stefanie Oscar APRN  Date of admission: 2023    Subjective   Subjective     Chief Complaint:   Chief Complaint   Patient presents with   • Dizziness         HPI:    Ana Reddy is a 48 y.o. female with a history of hypertension, hyperlipidemia, and asthma who presents to [facility] with chest pain. Patient reports when she got off work early this morning around 1 AM she noticed she was having some chest pain in the center of her chest that she describes as an aching pain and pressure.  She states that the pain started to radiate to her right shoulder.  She states that the pain did improve a bit enough for her to get some sleep.  She states she woke up again today having to be back at work at noon and started having the pain again.  She states it would wax and wane and then around 4:00 she decided to come into the ER.  She reports when she woke up this morning she was not feeling well felt very fatigued.  She reports some mild dizziness and nausea but no room spinning sensation.  She states that she did have an episode where the pressure got bad enough that she felt short of breath but that resolved shortly.  She denies any recent fevers or chills.  Denies cough and sore throat.  Denies abdominal pain and diarrhea.    In the ER, chest x-ray revealed no acute findings but does note a mildly enlarged cardiomediastinal silhouette.  High-sensitivity troponin remained negative x2.  EKG showed no acute ischemia but does note T wave inversions inferiorly and T wave flattening laterally.    Review of Systems   All systems were reviewed and negative except for: What is mentioned above in the HPI.    Personal History     Past Medical History:   Diagnosis Date   • Allergic 3/1992   • Anxiety    • Arthritis    • Asthma    • Depression    • Headache    • Hypertension     • Low back pain        Past Surgical History:   Procedure Laterality Date   • CHOLECYSTECTOMY     • ELBOW / UPPER ARM FOREIGN BODY REMOVAL Right    • TUBAL ABDOMINAL LIGATION         Family History: family history includes Arthritis in her mother; Cancer in her maternal grandfather; Depression in her maternal grandfather; Hypertension in her mother; Learning disabilities in her mother; Mental illness in her maternal grandfather; Thyroid disease in her mother. Otherwise pertinent FHx was reviewed and not pertinent to current issue.    Social History:  reports that she quit smoking about 14 years ago. Her smoking use included cigarettes. She started smoking about 30 years ago. She has a 0.25 pack-year smoking history. She has never used smokeless tobacco. She reports current alcohol use of about 2.0 standard drinks per week. She reports that she does not currently use drugs after having used the following drugs: Marijuana.    Home Medications:  Cetirizine HCl, amLODIPine, atorvastatin, benazepril, diclofenac, gabapentin, hydrOXYzine, hydroCHLOROthiazide, metFORMIN ER, montelukast, potassium chloride, venlafaxine, and vitamin D    Allergies:  Allergies   Allergen Reactions   • Effexor [Venlafaxine] Dizziness   • Penicillins Rash       Objective   Objective     Vitals:   Temp:  [97.1 °F (36.2 °C)-97.7 °F (36.5 °C)] 97.7 °F (36.5 °C)  Heart Rate:  [66-97] 66  Resp:  [16-18] 16  BP: (115-140)/(62-88) 115/62  Physical Exam    Constitutional: 48-year-old female in no acute distress on room air   Eyes: PERRLA, sclerae anicteric, no conjunctival injection   HENT: NCAT, mucous membranes moist   Neck: Supple, no thyromegaly, no lymphadenopathy, trachea midline   Respiratory: Clear to auscultation bilaterally, nonlabored respirations    Cardiovascular: RRR, no murmurs, rubs, or gallops, palpable pedal pulses bilaterally, no chest wall tenderness   Gastrointestinal: Positive bowel sounds, soft, nontender,  nondistended   Musculoskeletal: No bilateral ankle edema, no clubbing or cyanosis to extremities   Psychiatric: Appropriate affect, cooperative   Neurologic: Oriented x 3, strength symmetric in all extremities, Cranial Nerves grossly intact to confrontation, speech clear   Skin: No rashes     Result Review    Result Review:  I have personally reviewed the results from the time of this admission to 5/22/2023 23:00 EDT and agree with these findings:  [x]  Laboratory list / accordion  []  Microbiology  [x]  Radiology  [x]  EKG/Telemetry   []  Cardiology/Vascular   []  Pathology  [x]  Old records  []  Other:  Most notable findings include:   XR Chest 1 View    Result Date: 5/22/2023  STAT PORTABLE RADIOGRAPHIC VIEW OF THE CHEST.  CLINICAL HISTORY: Weakness, dizziness, altered mental status.  FINDINGS:  The lungs are clear of acute infiltrates. The cardiomediastinal silhouette is mildly enlarged. The osseous structures are incidentally notable for degenerative phenomena within the thoracic spine.       The cardiomediastinal silhouette is mildly enlarged. Otherwise, there is no active disease in the chest.  This report was finalized on 5/22/2023 6:51 PM by Dr. Adrian Son M.D.          Assessment & Plan   Assessment / Plan     Brief Patient Summary:  Ana Reddy is a 48 y.o. female who is being admitted to the observation unit for further evaluation of chest pain with the plan for serial troponins and cardiology consultation.    Active Hospital Problems:  Active Hospital Problems    Diagnosis    • **Chest pain      Plan:     Chest  Pain  -High-sensitivity troponin negative x2, trend  -EKG shows T wave inversions inferiorly and T wave flattening laterally, no acute ischemia  -Chest x-ray negative acute  -Cardiology consulted  -Aspirin/statin  -Nitro as needed for chest pain  -Telemetry monitoring  -N.p.o. after midnight    Hypertension  -Continue amlodipine, hydrochlorothiazide, lisinopril  -Monitor with vital  signs every 4 hours    Hyperlipidemia  -Continue statin    Asthma  -No acute exacerbation noted  -Albuterol as needed    DVT prophylaxis:  Mechanical DVT prophylaxis orders are present.    CODE STATUS:    Code Status (Patient has no pulse and is not breathing): CPR (Attempt to Resuscitate)  Medical Interventions (Patient has pulse or is breathing): Full Support    Admission Status:  I believe this patient meets observation status.    75 minutes have been spent by [facility] providers in the care of this patient while under observation status.      I wore an face mask, eye protection, and gloves during this patient encounter. Patient also wearing a surgical mask. Hand hygeine performed before and after seeing the patient.      Electronically signed by Mona Plummer PA-C, 05/22/23, 11:00 PM EDT.

## 2023-05-23 NOTE — ED PROVIDER NOTES
EMERGENCY DEPARTMENT ENCOUNTER    Room Number:  107/1  Date of encounter:  5/22/2023  PCP: Stefanie Oscar APRN  Historian: Patient    Patient was placed in face mask during triage process. Patient was wearing facemask when I entered the room and throughout our encounter. I wore full protective equipment throughout this patient encounter including a face mask, eye protection, and gloves. Hand hygiene was performed before donning protective equipment and again following doffing of PPE after leaving the room.    HPI:  Chief Complaint: Right shoulder discomfort and fatigue  A complete HPI/ROS/PMH/PSH/SH/FH are unobtainable due to: N/A   Context: Ana Reddy is a 48 y.o. female who presents to the ED c/o waxing and waning right shoulder discomfort with some radiation down her hand, abnormal sensation in her chest though she does not call it pain, some occasional dyspnea, and a little bit lightheaded just feeling off since starting work earlier this evening.  No nausea or vomiting.  No diarrhea.  No cough or fevers.  No clear exacerbating or relieving factors identified.  Patient denies prior cardiac evaluation.  No first-degree relatives with history of MI or CAD though she is not certain about her father.      MEDICAL HISTORY REVIEW  EMR reviewed:    PCP office note 5/11/2023 by MARGARET Ni to establish care and evaluation of back and leg pain as well as toe pain.  Patient was noted to have lumbar disc disease with radiculopathy and was established with a pain specialist in [place] for recently moving to [place].  Patient treated for primary hypertension, bipolar disorder, and struggling with morbid obesity.      PAST MEDICAL HISTORY  Active Ambulatory Problems     Diagnosis Date Noted   • Primary hypertension 05/11/2023   • Radiculopathy due to disorder of intervertebral disc of lumbar spine 05/11/2023   • Bipolar 1 disorder 05/11/2023   • Major depressive disorder 05/11/2023   • Seasonal  allergies 2023   • Vitamin D deficiency 2023   • Morbid obesity with BMI of 45.0-49.9, adult 2023   • Ingrowing nail, right great toe 2023     Resolved Ambulatory Problems     Diagnosis Date Noted   • No Resolved Ambulatory Problems     Past Medical History:   Diagnosis Date   • Allergic 3/1992   • Anxiety    • Arthritis    • Asthma    • Depression    • Headache    • Hypertension    • Low back pain          PAST SURGICAL HISTORY  Past Surgical History:   Procedure Laterality Date   • CHOLECYSTECTOMY     • ELBOW / UPPER ARM FOREIGN BODY REMOVAL Right    • TUBAL ABDOMINAL LIGATION           FAMILY HISTORY  Family History   Problem Relation Age of Onset   • Arthritis Mother    • Hypertension Mother    • Learning disabilities Mother    • Thyroid disease Mother    • Cancer Maternal Grandfather    • Depression Maternal Grandfather    • Mental illness Maternal Grandfather          SOCIAL HISTORY  Social History     Socioeconomic History   • Marital status: Significant Other   Tobacco Use   • Smoking status: Former     Packs/day: 0.25     Years: 1.00     Pack years: 0.25     Types: Cigarettes     Start date: 1992     Quit date: 2009     Years since quittin.0   • Smokeless tobacco: Never   Vaping Use   • Vaping Use: Never used   Substance and Sexual Activity   • Alcohol use: Yes     Alcohol/week: 2.0 standard drinks     Types: 1 Glasses of wine, 1 Drinks containing 0.5 oz of alcohol per week     Comment: socially   • Drug use: Not Currently     Types: Marijuana   • Sexual activity: Yes     Partners: Male     Birth control/protection: None         ALLERGIES  Effexor [venlafaxine] and Penicillins        REVIEW OF SYSTEMS  Review of Systems     All systems reviewed and negative except for those discussed in HPI.       PHYSICAL EXAM    I have reviewed the triage vital signs and nursing notes.    ED Triage Vitals   Temp Heart Rate Resp BP SpO2   23 1755 23 1755 23 1755  05/22/23 1756 05/22/23 1755   97.1 °F (36.2 °C) 97 18 140/88 97 %      Temp src Heart Rate Source Patient Position BP Location FiO2 (%)   -- -- -- -- --              Physical Exam    Physical Exam   Constitutional: No distress.  Pleasant and nontoxic  HENT:  Head: Normocephalic and atraumatic.   Oropharynx: Mucous membranes are moist.   Eyes: No scleral icterus. No conjunctival pallor.  Neck: Painless range of motion noted. Neck supple.   Cardiovascular: Normal rate, regular rhythm and intact distal pulses.  Pulmonary/Chest: No respiratory distress. There are no wheezes, no rhonchi, and no rales.   Abdominal: Soft. There is no tenderness. There is no rebound and no guarding.   Musculoskeletal: Moves all extremities equally. There is no pedal edema or calf tenderness.   Neurological: Alert.  Baseline strength and sensation noted.   Skin: Skin is pink, warm, and dry. No pallor.   Psychiatric: Mood and affect normal.   Nursing note and vitals reviewed.    LAB RESULTS  Recent Results (from the past 24 hour(s))   ECG 12 Lead ED Triage Standing Order; Weak / Dizzy / AMS    Collection Time: 05/22/23  6:02 PM   Result Value Ref Range    QT Interval 405 ms   Comprehensive Metabolic Panel    Collection Time: 05/22/23  6:08 PM    Specimen: Blood   Result Value Ref Range    Glucose 113 (H) 65 - 99 mg/dL    BUN 20 6 - 20 mg/dL    Creatinine 0.87 0.57 - 1.00 mg/dL    Sodium 138 136 - 145 mmol/L    Potassium 3.6 3.5 - 5.2 mmol/L    Chloride 102 98 - 107 mmol/L    CO2 26.4 22.0 - 29.0 mmol/L    Calcium 9.6 8.6 - 10.5 mg/dL    Total Protein 7.6 6.0 - 8.5 g/dL    Albumin 4.4 3.5 - 5.2 g/dL    ALT (SGPT) 23 1 - 33 U/L    AST (SGOT) 19 1 - 32 U/L    Alkaline Phosphatase 74 39 - 117 U/L    Total Bilirubin 0.2 0.0 - 1.2 mg/dL    Globulin 3.2 gm/dL    A/G Ratio 1.4 g/dL    BUN/Creatinine Ratio 23.0 7.0 - 25.0    Anion Gap 9.6 5.0 - 15.0 mmol/L    eGFR 81.8 >60.0 mL/min/1.73   Single High Sensitivity Troponin T    Collection Time: 05/22/23   6:08 PM    Specimen: Blood   Result Value Ref Range    HS Troponin T <6 <10 ng/L   Magnesium    Collection Time: 05/22/23  6:08 PM    Specimen: Blood   Result Value Ref Range    Magnesium 1.8 1.6 - 2.6 mg/dL   Green Top (Gel)    Collection Time: 05/22/23  6:08 PM   Result Value Ref Range    Extra Tube Hold for add-ons.    Lavender Top    Collection Time: 05/22/23  6:08 PM   Result Value Ref Range    Extra Tube hold for add-on    Gold Top - SST    Collection Time: 05/22/23  6:08 PM   Result Value Ref Range    Extra Tube Hold for add-ons.    Light Blue Top    Collection Time: 05/22/23  6:08 PM   Result Value Ref Range    Extra Tube Hold for add-ons.    CBC Auto Differential    Collection Time: 05/22/23  6:08 PM    Specimen: Blood   Result Value Ref Range    WBC 6.24 3.40 - 10.80 10*3/mm3    RBC 3.86 3.77 - 5.28 10*6/mm3    Hemoglobin 11.2 (L) 12.0 - 15.9 g/dL    Hematocrit 33.4 (L) 34.0 - 46.6 %    MCV 86.5 79.0 - 97.0 fL    MCH 29.0 26.6 - 33.0 pg    MCHC 33.5 31.5 - 35.7 g/dL    RDW 13.5 12.3 - 15.4 %    RDW-SD 42.8 37.0 - 54.0 fl    MPV 10.0 6.0 - 12.0 fL    Platelets 325 140 - 450 10*3/mm3    Neutrophil % 64.6 42.7 - 76.0 %    Lymphocyte % 24.2 19.6 - 45.3 %    Monocyte % 6.3 5.0 - 12.0 %    Eosinophil % 3.7 0.3 - 6.2 %    Basophil % 1.0 0.0 - 1.5 %    Immature Grans % 0.2 0.0 - 0.5 %    Neutrophils, Absolute 4.04 1.70 - 7.00 10*3/mm3    Lymphocytes, Absolute 1.51 0.70 - 3.10 10*3/mm3    Monocytes, Absolute 0.39 0.10 - 0.90 10*3/mm3    Eosinophils, Absolute 0.23 0.00 - 0.40 10*3/mm3    Basophils, Absolute 0.06 0.00 - 0.20 10*3/mm3    Immature Grans, Absolute 0.01 0.00 - 0.05 10*3/mm3    nRBC 0.0 0.0 - 0.2 /100 WBC   Urinalysis With Microscopic If Indicated (No Culture) - Urine, Clean Catch    Collection Time: 05/22/23  8:08 PM    Specimen: Urine, Clean Catch   Result Value Ref Range    Color, UA Yellow Yellow, Straw    Appearance, UA Clear Clear    pH, UA 5.5 5.0 - 8.0    Specific Gravity, UA >=1.030 1.005 -  1.030    Glucose, UA Negative Negative    Ketones, UA Negative Negative    Bilirubin, UA Negative Negative    Blood, UA Small (1+) (A) Negative    Protein, UA Negative Negative    Leuk Esterase, UA Negative Negative    Nitrite, UA Negative Negative    Urobilinogen, UA 0.2 E.U./dL 0.2 - 1.0 E.U./dL   Urinalysis, Microscopic Only - Urine, Clean Catch    Collection Time: 05/22/23  8:08 PM    Specimen: Urine, Clean Catch   Result Value Ref Range    RBC, UA 0-2 None Seen, 0-2 /HPF    WBC, UA 0-2 None Seen, 0-2 /HPF    Bacteria, UA None Seen None Seen /HPF    Squamous Epithelial Cells, UA 0-2 None Seen, 0-2 /HPF    Hyaline Casts, UA 3-6 None Seen /LPF    Amorphous Crystals, UA Small/1+ None Seen /HPF    Methodology Manual Light Microscopy        Ordered the above labs and independently reviewed the results.        RADIOLOGY  XR Chest 1 View    Result Date: 5/22/2023  STAT PORTABLE RADIOGRAPHIC VIEW OF THE CHEST.  CLINICAL HISTORY: Weakness, dizziness, altered mental status.  FINDINGS:  The lungs are clear of acute infiltrates. The cardiomediastinal silhouette is mildly enlarged. The osseous structures are incidentally notable for degenerative phenomena within the thoracic spine.       The cardiomediastinal silhouette is mildly enlarged. Otherwise, there is no active disease in the chest.  This report was finalized on 5/22/2023 6:51 PM by Dr. Adrian Son M.D.        I ordered the above noted radiological studies. Reviewed by me and discussed with radiologist.  See dictation for official radiology interpretation.      PROCEDURES    Procedures        MEDICATIONS GIVEN IN ER    Medications   sodium chloride 0.9 % flush 10 mL (has no administration in time range)         PROGRESS, DATA ANALYSIS, CONSULTS, AND MEDICAL DECISION MAKING    My differential diagnosis of the upper extremity pain or injury includes but is not limited to contusions of the shoulder, forearm, arm, wrist, elbow or hand, dislocations of shoulder, elbow,  wrist, digits, nursemaid's elbow (age-appropriate), shoulder sprain, elbow sprain, wrist sprain, digit sprain, shoulder strain, arm strain, forearm strain, elbow strain, wrist strain, hand sprain, digit strain, lacerations of the upper extremity, fractures both closed and open of clavicle, scapula, humerus, radius, ulna, bones of the wrist, hands and digits, cellulitis or abscess, cervical radiculopathy, radial nerve palsy, neurogenic upper extremity pain, angina equivalent, SVT, DVT, arterial occlusion, compartment syndrome.    HEART SCORE:    History #1  (Highly suspicious 2, Moderately suspicious 1, Slightly or non-suspicious 0)    ECG #1  (Significant ST depression 2,  Nonspecific repol disturbance 1, Normal 0)    Age #1  (> or = 65 2, 46-65 1,  < or = 45 0)    Risk factors #2  (hypercholesterolemia, HTN, DM, smoking, pos fam hx, obesity)  (> or = to 3 RF 2, 1 or 2 1, No risk factors 0)    Troponin #0  (> or = 3x normal limit 2, 1-3x normal limit 1, < or = Normal limit 0)    HEART Score Key:  Scores 0-3: 0.9-1.7% risk of adverse cardiac event. In the HEART Score study, these patients were discharged (0.99% in the retrospective study, 1.7% in the prospective study)  Scores 4-6: 12-16.6% risk of adverse cardiac event. In the HEART Score study, these patients were admitted to the hospital. (11.6% retrospective, 16.6% prospective)  Scores ?7: 50-65% risk of adverse cardiac event. In the HEART Score study, these patients were candidates for early invasive measures. (65.2% retrospective, 50.1% prospective)      This patient's HEART score is 5      All labs have been independently reviewed by me.  All radiology studies have been reviewed by me and discussed with radiologist dictating the report.   EKG's independently viewed and interpreted by me.  Discussion below represents my analysis of pertinent findings related to patient's condition, differential diagnosis, treatment plan and final disposition.      ED Course as  of 05/22/23 2156   Mon May 22, 2023   2030 HS Troponin T: <6 [RS]   2031 WBC: 6.24 [RS]   2031 Hemoglobin(!): 11.2 [RS]   2031 Platelets: 325 [RS]   2031 Magnesium: 1.8 [RS]   2031 Glucose(!): 113 [RS]   2031 BUN: 20 [RS]   2031 Creatinine: 0.87 [RS]   2031 Sodium: 138 [RS]   2031 Potassium: 3.6 [RS]   2031 RADIOLOGY      Study: Single view portable chest  Findings: No pneumothorax or focal infiltrate appreciated  I independently viewed and interpreted these images contemporaneously with treatment.    [RS]   2031 EKG           EKG time: 1802  Rhythm/Rate: 90s, 80  P waves and WA: LAURA within normal limits  QRS, axis: Borderline IVCD with slow R wave progression  ST and T waves: No STEMI; QTc within normal limits; there are some T wave inversions inferiorly and T wave flattening laterally    Interpreted Contemporaneously by me, independently viewed  Comparison: Unavailable   [RS]   2156 Late entry:  Concerns discussed with patient and recommend observation admission for further evaluation treatment.  Patient agreeable.    CONSULT        Provider: FAISAL Guaman MA    Discussion: Reviewed patient history, ED presentation and evaluation as well as concerns.  Agreeable to accept patient to the ED observation unit for further evaluation and treatment.    Agreeable c treatment and planned disposition.         [RS]      ED Course User Index  [RS] Wyatt Carrera MD       AS OF 21:56 EDT VITALS:    BP - 115/62  HR - 66  TEMP - 97.7 °F (36.5 °C) (Oral)  O2 SATS - 99%        DIAGNOSIS  Final diagnoses:   Anginal equivalent (right shoulder discomfort with abnormal sensation in chest)   Elevated blood pressure reading in office with diagnosis of hypertension   History of diabetes mellitus   Obesity, unspecified classification, unspecified obesity type, unspecified whether serious comorbidity present         DISPOSITION  ADMISSION    Discussed treatment plan and reason for admission with pt/family and admitting physician.   Pt/family voiced understanding of the plan for admission for further testing/treatment as needed.               Wyatt Carrera MD  05/22/23 9687

## 2023-05-23 NOTE — PROGRESS NOTES
MD ATTESTATION NOTE    The MARY and I have discussed this patient's history, physical exam, and treatment plan.  I have reviewed the documentation and personally had a face to face interaction with the patient. I affirm the documentation and agree with the treatment and plan.  The attached note describes my personal findings.      I provided a substantive portion of the care of the patient.  I personally performed the physical exam in its entirety, and below are my findings.  For this patient encounter, the patient wore surgical mask, I wore full protective PPE including N95 and eye protection.      Brief HPI: Patient is being admitted for further evaluation of chest pain.  Patient complains of right-sided chest pain that radiates into the right shoulder along with mild shortness of breath.  Pain is not related to exertion.  She has a history of hypertension, hyperlipidemia, prediabetes, and a BMI of 47.  ED work-up reviewed.  Troponin was negative.  Chest x-ray was negative acute.  EKG had some nonspecific changes.  This morning, patient complains of minimal intermittent chest discomfort but denies shortness of breath, nausea, vomiting, or sweating.    PHYSICAL EXAM  ED Triage Vitals   Temp Heart Rate Resp BP SpO2   05/22/23 1755 05/22/23 1755 05/22/23 1755 05/22/23 1756 05/22/23 1755   97.1 °F (36.2 °C) 97 18 140/88 97 %      Temp src Heart Rate Source Patient Position BP Location FiO2 (%)   05/22/23 2129 05/22/23 2100 05/22/23 2100 05/22/23 2100 --   Oral Monitor Sitting Right arm          GENERAL: Awake, alert, oriented x3.  Well-developed, well-nourished female.  Resting comfortably in no acute distress  HENT: nares patent  EYES: no scleral icterus  CV: regular rhythm, normal rate  RESPIRATORY: normal effort, clear to auscultation bilaterally  ABDOMEN: soft, nontender  MUSCULOSKELETAL: no deformity  NEURO: alert, moves all extremities, follows commands  PSYCH:  calm, cooperative  SKIN: warm, dry    Vital signs  and nursing notes reviewed.        Plan: Troponin is negative x3.  Patient has been seen by cardiology and is awaiting stress test.

## 2023-05-24 ENCOUNTER — TRANSITIONAL CARE MANAGEMENT TELEPHONE ENCOUNTER (OUTPATIENT)
Dept: CALL CENTER | Facility: HOSPITAL | Age: 48
End: 2023-05-24
Payer: COMMERCIAL

## 2023-05-24 NOTE — OUTREACH NOTE
Call Center TCM Note    Flowsheet Row Responses   [facility] patient discharged from? [place]   Does the patient have one of the following disease processes/diagnoses(primary or secondary)? Other   TCM attempt successful? No   Unsuccessful attempts Attempt 2          Sruthi Bro RN    5/24/2023, 13:50 EDT

## 2023-05-24 NOTE — OUTREACH NOTE
Call Center TCM Note    Flowsheet Row Responses   [facility] patient discharged from? [place]   Does the patient have one of the following disease processes/diagnoses(primary or secondary)? Other   TCM attempt successful? No   Unsuccessful attempts Attempt 1  [Kayla and Magen on verbal release-no answer]          Sruthi Bro RN    5/24/2023, 10:57 EDT

## 2023-05-25 ENCOUNTER — TRANSITIONAL CARE MANAGEMENT TELEPHONE ENCOUNTER (OUTPATIENT)
Dept: CALL CENTER | Facility: HOSPITAL | Age: 48
End: 2023-05-25
Payer: COMMERCIAL

## 2023-05-25 NOTE — OUTREACH NOTE
Call Center TCM Note    Flowsheet Row Responses   [facility] patient discharged from? [place]   Does the patient have one of the following disease processes/diagnoses(primary or secondary)? Other   TCM attempt successful? Yes   Call start time 0846   Call end time 0850   Discharge diagnosis Chest pain   Person spoke with today (if not patient) and relationship pt   Meds reviewed with patient/caregiver? Yes   Is the patient having any side effects they believe may be caused by any medication additions or changes? No   Does the patient have all medications ordered at discharge? N/A   Is the patient taking all medications as directed (includes completed medication regime)? Yes   Does the patient have an appointment with their PCP within 7 days of discharge? Yes  [5/30/2023 11:00 AM]   Psychosocial issues? No   Did the patient receive a copy of their discharge instructions? Yes   Nursing interventions Reviewed instructions with patient   What is the patient's perception of their health status since discharge? Improving   Is the patient/caregiver able to teach back signs and symptoms related to disease process for when to call PCP? Yes   Is the patient/caregiver able to teach back signs and symptoms related to disease process for when to call 911? Yes   Is the patient/caregiver able to teach back the hierarchy of who to call/visit for symptoms/problems? PCP, Specialist, Home health nurse, Urgent Care, ED, 911 Yes   If the patient is a current smoker, are they able to teach back resources for cessation? Not a smoker   TCM call completed? Yes   Wrap up additional comments Pt states she is doing better, and shares that the Metformin was making her feel bad as she was getting use to it. Pt verified PCP hospital fu appt on 5/30/23 at 11:00 AM.   Call end time 0850   Would this patient benefit from a Referral to Amb Social Work? No   Is the patient interested in additional calls from an ambulatory ?  NOTE:   applies to high risk patients requiring additional follow-up. Damaris Lopez RN    5/25/2023, 08:52 EDT

## 2023-06-02 RX ORDER — ERGOCALCIFEROL 1.25 MG/1
50000 CAPSULE ORAL WEEKLY
Qty: 30 CAPSULE | Refills: 2 | Status: SHIPPED | OUTPATIENT
Start: 2023-06-02

## 2023-06-23 PROBLEM — R21 RASH IN ADULT: Status: ACTIVE | Noted: 2023-06-23

## 2023-07-31 ENCOUNTER — TELEPHONE (OUTPATIENT)
Dept: INTERNAL MEDICINE | Age: 48
End: 2023-07-31

## 2023-07-31 NOTE — TELEPHONE ENCOUNTER
Caller: DENISE    Relationship: Other    Best call back number: [phone]     What was the call regarding: PLEASE SEND ORDERS FOR MRI OF LUMBAR SPINE W/O CONTRAST TO [facility] FAX #[phone]. PATIENT IS SCHEDULED FOR 8/18 AT 4 PM. IT WAS TOO EXPENSIVE TO DO THIS MRI AT [facility]. PLEASE ADVISE PATIENT WHEN SENT.

## 2023-08-23 ENCOUNTER — OFFICE VISIT (OUTPATIENT)
Dept: INTERNAL MEDICINE | Facility: CLINIC | Age: 48
End: 2023-08-23
Payer: COMMERCIAL

## 2023-08-23 VITALS
DIASTOLIC BLOOD PRESSURE: 74 MMHG | RESPIRATION RATE: 20 BRPM | OXYGEN SATURATION: 99 % | BODY MASS INDEX: 47.63 KG/M2 | HEART RATE: 80 BPM | TEMPERATURE: 97.3 F | WEIGHT: 252.25 LBS | SYSTOLIC BLOOD PRESSURE: 124 MMHG | HEIGHT: 61 IN

## 2023-08-23 DIAGNOSIS — I10 PRIMARY HYPERTENSION: ICD-10-CM

## 2023-08-23 DIAGNOSIS — M51.16 RADICULOPATHY DUE TO DISORDER OF INTERVERTEBRAL DISC OF LUMBAR SPINE: ICD-10-CM

## 2023-08-23 DIAGNOSIS — F31.9 BIPOLAR 1 DISORDER: ICD-10-CM

## 2023-08-23 DIAGNOSIS — M17.0 PRIMARY OSTEOARTHRITIS OF BOTH KNEES: ICD-10-CM

## 2023-08-23 DIAGNOSIS — E78.00 PURE HYPERCHOLESTEROLEMIA: ICD-10-CM

## 2023-08-23 DIAGNOSIS — R73.01 IMPAIRED FASTING GLUCOSE: Primary | ICD-10-CM

## 2023-08-23 DIAGNOSIS — M65.331 TRIGGER MIDDLE FINGER OF RIGHT HAND: ICD-10-CM

## 2023-08-23 DIAGNOSIS — L60.0 INGROWING NAIL, RIGHT GREAT TOE: ICD-10-CM

## 2023-08-23 DIAGNOSIS — Z11.59 NEED FOR HEPATITIS C SCREENING TEST: ICD-10-CM

## 2023-08-23 PROBLEM — R21 RASH IN ADULT: Status: RESOLVED | Noted: 2023-06-23 | Resolved: 2023-08-23

## 2023-08-23 PROBLEM — R07.9 CHEST PAIN: Status: RESOLVED | Noted: 2023-05-23 | Resolved: 2023-08-23

## 2023-08-23 LAB
ALBUMIN SERPL-MCNC: 4.2 G/DL (ref 3.5–5.2)
ALBUMIN/GLOB SERPL: 1.5 G/DL
ALP SERPL-CCNC: 62 U/L (ref 39–117)
ALT SERPL W P-5'-P-CCNC: 24 U/L (ref 1–33)
ANION GAP SERPL CALCULATED.3IONS-SCNC: 13.3 MMOL/L (ref 5–15)
AST SERPL-CCNC: 21 U/L (ref 1–32)
BASOPHILS # BLD AUTO: 0.06 10*3/MM3 (ref 0–0.2)
BASOPHILS NFR BLD AUTO: 1 % (ref 0–1.5)
BILIRUB SERPL-MCNC: <0.2 MG/DL (ref 0–1.2)
BUN SERPL-MCNC: 22 MG/DL (ref 6–20)
BUN/CREAT SERPL: 33.8 (ref 7–25)
CALCIUM SPEC-SCNC: 9.4 MG/DL (ref 8.6–10.5)
CHLORIDE SERPL-SCNC: 104 MMOL/L (ref 98–107)
CHOLEST SERPL-MCNC: 153 MG/DL (ref 0–200)
CO2 SERPL-SCNC: 24.7 MMOL/L (ref 22–29)
CREAT SERPL-MCNC: 0.65 MG/DL (ref 0.57–1)
DEPRECATED RDW RBC AUTO: 42.9 FL (ref 37–54)
EGFRCR SERPLBLD CKD-EPI 2021: 108.1 ML/MIN/1.73
EOSINOPHIL # BLD AUTO: 0.64 10*3/MM3 (ref 0–0.4)
EOSINOPHIL NFR BLD AUTO: 10.3 % (ref 0.3–6.2)
ERYTHROCYTE [DISTWIDTH] IN BLOOD BY AUTOMATED COUNT: 13.7 % (ref 12.3–15.4)
GLOBULIN UR ELPH-MCNC: 2.8 GM/DL
GLUCOSE SERPL-MCNC: 99 MG/DL (ref 65–99)
HBA1C MFR BLD: 5.6 % (ref 4.8–5.6)
HCT VFR BLD AUTO: 33.9 % (ref 34–46.6)
HCV AB SER DONR QL: NORMAL
HDLC SERPL-MCNC: 41 MG/DL (ref 40–60)
HGB BLD-MCNC: 11.4 G/DL (ref 12–15.9)
IMM GRANULOCYTES # BLD AUTO: 0.02 10*3/MM3 (ref 0–0.05)
IMM GRANULOCYTES NFR BLD AUTO: 0.3 % (ref 0–0.5)
LDLC SERPL CALC-MCNC: 92 MG/DL (ref 0–100)
LDLC/HDLC SERPL: 2.21 {RATIO}
LYMPHOCYTES # BLD AUTO: 1.72 10*3/MM3 (ref 0.7–3.1)
LYMPHOCYTES NFR BLD AUTO: 27.7 % (ref 19.6–45.3)
MCH RBC QN AUTO: 29.5 PG (ref 26.6–33)
MCHC RBC AUTO-ENTMCNC: 33.6 G/DL (ref 31.5–35.7)
MCV RBC AUTO: 87.6 FL (ref 79–97)
MONOCYTES # BLD AUTO: 0.54 10*3/MM3 (ref 0.1–0.9)
MONOCYTES NFR BLD AUTO: 8.7 % (ref 5–12)
NEUTROPHILS NFR BLD AUTO: 3.24 10*3/MM3 (ref 1.7–7)
NEUTROPHILS NFR BLD AUTO: 52 % (ref 42.7–76)
NRBC BLD AUTO-RTO: 0 /100 WBC (ref 0–0.2)
PLATELET # BLD AUTO: 291 10*3/MM3 (ref 140–450)
PMV BLD AUTO: 11.2 FL (ref 6–12)
POTASSIUM SERPL-SCNC: 3.7 MMOL/L (ref 3.5–5.2)
PROT SERPL-MCNC: 7 G/DL (ref 6–8.5)
RBC # BLD AUTO: 3.87 10*6/MM3 (ref 3.77–5.28)
SODIUM SERPL-SCNC: 142 MMOL/L (ref 136–145)
TRIGL SERPL-MCNC: 107 MG/DL (ref 0–150)
TSH SERPL DL<=0.05 MIU/L-ACNC: 2.78 UIU/ML (ref 0.27–4.2)
VLDLC SERPL-MCNC: 20 MG/DL (ref 5–40)
WBC NRBC COR # BLD: 6.22 10*3/MM3 (ref 3.4–10.8)

## 2023-08-23 PROCEDURE — 86803 HEPATITIS C AB TEST: CPT | Performed by: INTERNAL MEDICINE

## 2023-08-23 PROCEDURE — 83036 HEMOGLOBIN GLYCOSYLATED A1C: CPT | Performed by: INTERNAL MEDICINE

## 2023-08-23 PROCEDURE — 80061 LIPID PANEL: CPT | Performed by: INTERNAL MEDICINE

## 2023-08-23 PROCEDURE — 80050 GENERAL HEALTH PANEL: CPT | Performed by: INTERNAL MEDICINE

## 2023-08-23 PROCEDURE — 99204 OFFICE O/P NEW MOD 45 MIN: CPT | Performed by: INTERNAL MEDICINE

## 2023-08-23 RX ORDER — FERROUS SULFATE TAB EC 324 MG (65 MG FE EQUIVALENT) 324 (65 FE) MG
324 TABLET DELAYED RESPONSE ORAL
COMMUNITY

## 2023-08-23 RX ORDER — ACETAMINOPHEN 500 MG
500 TABLET ORAL EVERY 6 HOURS PRN
COMMUNITY

## 2023-08-23 RX ORDER — ERGOCALCIFEROL 1.25 MG/1
50000 CAPSULE ORAL WEEKLY
Qty: 30 CAPSULE | Refills: 2 | Status: SHIPPED | OUTPATIENT
Start: 2023-08-23

## 2023-08-23 RX ORDER — MULTIVIT WITH MINERALS/LUTEIN
250 TABLET ORAL DAILY
COMMUNITY

## 2023-08-23 RX ORDER — NAPROXEN SODIUM 220 MG
220 TABLET ORAL 2 TIMES DAILY PRN
COMMUNITY

## 2023-08-23 NOTE — PROGRESS NOTES
"Chief Complaint  Med Management (Medication management, extreme pain in knees, also had back pain for years from herniated disc, referral for pain management but needs mri before they will see her, it is scheduled for 8/31/23 from a different provider, general check up. She has been on edge lately. Also thinks she needs to be back on something for bipolar. ) and Establish Care (New patient )    Nubia Reddy presents to [facility]  History of Present Illness    Presenting to establish care  Moved to [place] in February.     Previously seeing an APRN in [place] but wanted to switch to a clinic closer to home.     Was diagnosed with pre-diabetes and started on Metformin in May. Tolerating medication well.    Has chronic back pain from previous injuries. Has been referred to Pain Management, but needs MRI before they will see her. Has MRI scheduled for 8/31.     Was referred to podiatry for toenail issues. Has not yet scheduled the appointment.     She complains of severe chronic bilateral knee pain. She states that she has been dx with osteoarthritis.     She complains of triggering of right middle finger. States that she had previously gotten steroid injections in this area which did help.    She has been dx with bipolar disorder. She has not established care with a psychiatrist since moving to [place]. She is interested in this since she has felt more on edge lately.         Objective   Vital Signs:   /74 (BP Location: Left arm, Patient Position: Sitting, Cuff Size: Adult)   Pulse 80   Temp 97.3 øF (36.3 øC) (Temporal)   Resp 20   Ht 154.9 cm (60.98\")   Wt 114 kg (252 lb 4 oz)   SpO2 99%   BMI 47.69 kg/mý     Wt Readings from Last 3 Encounters:   08/23/23 114 kg (252 lb 4 oz)   06/23/23 112 kg (247 lb)   05/23/23 114 kg (251 lb 5.2 oz)     BP Readings from Last 3 Encounters:   08/23/23 124/74   06/23/23 114/72   05/23/23 145/83 "         Physical Exam  Vitals reviewed.   Constitutional:       Appearance: Normal appearance. She is well-developed.   HENT:      Head: Normocephalic and atraumatic.      Mouth/Throat:      Pharynx: No oropharyngeal exudate.   Eyes:      Conjunctiva/sclera: Conjunctivae normal.      Pupils: Pupils are equal, round, and reactive to light.   Neck:      Thyroid: No thyromegaly or thyroid tenderness.   Cardiovascular:      Rate and Rhythm: Normal rate and regular rhythm.      Heart sounds: No murmur heard.    No friction rub. No gallop.   Pulmonary:      Effort: Pulmonary effort is normal.      Breath sounds: Normal breath sounds. No wheezing or rhonchi.   Lymphadenopathy:      Cervical: No cervical adenopathy.   Skin:     General: Skin is warm and dry.   Neurological:      Mental Status: She is alert and oriented to person, place, and time.   Psychiatric:         Mood and Affect: Affect normal.        Result Review :  The following data was reviewed by: Chio Elizalde MD on 08/23/2023:    LABS      Common labs          5/11/2023    11:06 5/22/2023    18:08 5/23/2023    04:26   Common Labs   Glucose 103  113  95    BUN 20  20  16    Creatinine 0.80  0.87  0.62    Sodium 139  138  141    Potassium 4.0  3.6  3.8    Chloride 101  102  105    Calcium 9.4  9.6  8.8    Total Protein 7.0      Albumin 4.2  4.4     Total Bilirubin 0.2  0.2     Alkaline Phosphatase 66  74     AST (SGOT) 19  19     ALT (SGPT) 20  23     WBC 5.77  6.24  5.59    Hemoglobin 11.6  11.2  10.4    Hematocrit 34.5  33.4  31.5    Platelets 307  325  281    Total Cholesterol   124    Total Cholesterol 208      Triglycerides 112   104    HDL Cholesterol 43   43    LDL Cholesterol  145   62    Hemoglobin A1C 5.70          No visits with results within 1 Month(s) from this visit.   Latest known visit with results is:   Hospital Outpatient Visit on 05/23/2023   Component Date Value    Target HR (85%) 05/23/2023 145     Max. Pred. HR (100%)  2023 171     Nuclear Prior Study 2023 2.0     BH CV REST NUCLEAR ISOTO* 2023 38.4     BH CV STRESS NUCLEAR ISO* 2023 38.4     BH CV STRESS PROTOCOL 1 2023 Pharmacologic     Stage 1 2023 1     HR Stage 1 2023 84     BP Stage 1 2023 124/73     Duration Min Stage 1 2023 0     Duration Sec Stage 1 2023 10     Stress Dose Regadenoson * 2023 0.4     Stress Comments Stage 1 2023 10 sec bolus injection     Baseline HR 2023 82     Baseline BP 2023 127/84     Peak HR 2023 84     Percent Max Pred HR 2023 49.12     Percent Target HR 2023 58     Peak BP 2023 124/73     Recovery HR 2023 66     Recovery BP 2023 136/72     Exercise duration (sec) 2023 10     Nuc Stress EF 2023 66        Lab Results   Component Value Date    BILIRUBINUR Negative 2023        IMAGING  XR Chest 1 View    Result Date: 2023   The cardiomediastinal silhouette is mildly enlarged. Otherwise, there is no active disease in the chest.  This report was finalized on 2023 6:51 PM by Dr. Adrian Son M.D.        Procedures         HEALTH MAINTENANCE            Social History     Tobacco Use   Smoking Status Former    Packs/day: 0.25    Years: 1.00    Pack years: 0.25    Types: Cigarettes    Start date: 1992    Quit date: 2009    Years since quittin.3   Smokeless Tobacco Never              Assessment and Plan   Diagnoses and all orders for this visit:    1. Impaired fasting glucose (Primary)  Assessment & Plan:  Doing well on Metformin  Checking follow up labs today    Orders:  -     CBC & Differential  -     Comprehensive Metabolic Panel  -     Hemoglobin A1c  -     Lipid Panel  -     TSH    2. Primary hypertension  Assessment & Plan:  Well controlled in clinic today  Continue current management      3. Pure hypercholesterolemia  Assessment & Plan:  Well controlled on Statin  Checking follow up labs  today      4. Trigger middle finger of right hand  Assessment & Plan:  Will refer to ortho for evaluation of injection vs surgery    Orders:  -     Ambulatory Referral to Orthopedic Surgery    5. Primary osteoarthritis of both knees  -     Ambulatory Referral to Orthopedic Surgery    6. Bipolar 1 disorder  Assessment & Plan:  Patient agrees to referral to psychiatry. Will try to get her scheduled with APRN that comes to this clinic location.    Orders:  -     Ambulatory Referral to Psychiatry    7. Ingrowing nail, right great toe  Assessment & Plan:  Keep appointment with podiatry      8. Radiculopathy due to disorder of intervertebral disc of lumbar spine  Assessment & Plan:  MRI scheduled for 8/31  Keep appointment with pain management      9. Need for hepatitis C screening test  -     Hepatitis C Antibody              FOLLOW UP  Return in about 3 months (around 11/23/2023) for Next scheduled follow up.  Patient was given instructions and counseling regarding her condition or for health maintenance advice. Please see specific information pulled into the AVS if appropriate.       Chio Elizalde MD  08/23/23  11:15 EDT    CURRENT & DISCONTINUED MEDICATIONS  Current Outpatient Medications   Medication Instructions    acetaminophen (TYLENOL) 500 mg, Oral, Every 6 Hours PRN    amLODIPine (NORVASC) 10 mg, Oral, Daily    atorvastatin (LIPITOR) 10 mg, Oral, Daily    benazepril (LOTENSIN) 10 mg, Oral, Daily    Cetirizine HCl 10 mg, Oral, Daily    clotrimazole-betamethasone (Lotrisone) 1-0.05 % cream 1 application , Topical, 2 Times Daily    diclofenac (VOLTAREN) 75 mg, Oral, 2 Times Daily    Diclofenac Sodium (VOLTAREN) 4 g, Topical, 3 Times Daily    ferrous sulfate 324 mg, Oral, Daily With Breakfast    gabapentin (NEURONTIN) 400 MG capsule No dose, route, or frequency recorded.    hydroCHLOROthiazide (MICROZIDE) 12.5 mg, Oral, Daily    hydrOXYzine (ATARAX) 25 mg, Oral, 3 Times Daily PRN    metFORMIN ER  (GLUCOPHAGE-XR) 500 mg, Oral, Daily With Breakfast    montelukast (SINGULAIR) 10 mg, Oral, Nightly    naproxen sodium (ALEVE) 220 mg, Oral, 2 Times Daily PRN    potassium chloride (K-DUR,KLOR-CON) 10 MEQ CR tablet 10 mEq, Oral, Daily    venlafaxine (EFFEXOR) 25 mg, Oral, 3 Times Daily    vitamin C (ASCORBIC ACID) 250 mg, Oral, Daily    vitamin D (ERGOCALCIFEROL) 50,000 Units, Oral, Weekly       There are no discontinued medications.

## 2023-08-23 NOTE — ASSESSMENT & PLAN NOTE
Patient agrees to referral to psychiatry. Will try to get her scheduled with APRN that comes to this clinic location.

## 2023-08-23 NOTE — TELEPHONE ENCOUNTER
Confirmed dosage with patient in office. States she is needing a refill of this medication. Was last sent by another provider. Okay to send? Patient would like it sent to [facility].

## 2023-09-03 ENCOUNTER — HOSPITAL ENCOUNTER (EMERGENCY)
Facility: HOSPITAL | Age: 48
Discharge: HOME | End: 2023-09-04
Attending: EMERGENCY MEDICINE | Admitting: EMERGENCY MEDICINE
Payer: COMMERCIAL

## 2023-09-03 DIAGNOSIS — M79.604 ACUTE LEG PAIN, RIGHT: Primary | ICD-10-CM

## 2023-09-03 DIAGNOSIS — M54.16 LUMBAR RADICULOPATHY, RIGHT: ICD-10-CM

## 2023-09-03 PROCEDURE — 99283 EMERGENCY DEPT VISIT LOW MDM: CPT

## 2023-09-03 NOTE — Clinical Note
[facility] EMERGENCY DEPARTMENT  [address] [facility] [address]  Phone: [phone]    Ana Reddy was seen and treated in our emergency department on 9/3/2023.  She may return to work on 09/06/2023.         Thank you for choosing [facility].    Kyree Pierson MD

## 2023-09-04 ENCOUNTER — APPOINTMENT (OUTPATIENT)
Dept: GENERAL RADIOLOGY | Facility: HOSPITAL | Age: 48
End: 2023-09-04
Payer: COMMERCIAL

## 2023-09-04 VITALS
WEIGHT: 252 LBS | TEMPERATURE: 97.8 F | OXYGEN SATURATION: 97 % | HEART RATE: 98 BPM | BODY MASS INDEX: 47.58 KG/M2 | RESPIRATION RATE: 16 BRPM | DIASTOLIC BLOOD PRESSURE: 89 MMHG | SYSTOLIC BLOOD PRESSURE: 128 MMHG | HEIGHT: 61 IN

## 2023-09-04 LAB
ALBUMIN SERPL-MCNC: 4.4 G/DL (ref 3.5–5.2)
ALBUMIN/GLOB SERPL: 1.8 G/DL
ALP SERPL-CCNC: 68 U/L (ref 39–117)
ALT SERPL W P-5'-P-CCNC: 22 U/L (ref 1–33)
ANION GAP SERPL CALCULATED.3IONS-SCNC: 11 MMOL/L (ref 5–15)
AST SERPL-CCNC: 20 U/L (ref 1–32)
BASOPHILS # BLD AUTO: 0.05 10*3/MM3 (ref 0–0.2)
BASOPHILS NFR BLD AUTO: 0.8 % (ref 0–1.5)
BILIRUB SERPL-MCNC: 0.2 MG/DL (ref 0–1.2)
BUN SERPL-MCNC: 23 MG/DL (ref 6–20)
BUN/CREAT SERPL: 27.4 (ref 7–25)
CALCIUM SPEC-SCNC: 9.4 MG/DL (ref 8.6–10.5)
CHLORIDE SERPL-SCNC: 106 MMOL/L (ref 98–107)
CO2 SERPL-SCNC: 25 MMOL/L (ref 22–29)
CREAT SERPL-MCNC: 0.84 MG/DL (ref 0.57–1)
CRP SERPL-MCNC: 0.47 MG/DL (ref 0–0.5)
D DIMER PPP FEU-MCNC: 0.47 MCGFEU/ML (ref 0–0.5)
DEPRECATED RDW RBC AUTO: 46.2 FL (ref 37–54)
EGFRCR SERPLBLD CKD-EPI 2021: 85.3 ML/MIN/1.73
EOSINOPHIL # BLD AUTO: 0.53 10*3/MM3 (ref 0–0.4)
EOSINOPHIL NFR BLD AUTO: 8.2 % (ref 0.3–6.2)
ERYTHROCYTE [DISTWIDTH] IN BLOOD BY AUTOMATED COUNT: 14.2 % (ref 12.3–15.4)
ERYTHROCYTE [SEDIMENTATION RATE] IN BLOOD: 21 MM/HR (ref 0–20)
GLOBULIN UR ELPH-MCNC: 2.5 GM/DL
GLUCOSE SERPL-MCNC: 111 MG/DL (ref 65–99)
HCT VFR BLD AUTO: 33 % (ref 34–46.6)
HGB BLD-MCNC: 10.8 G/DL (ref 12–15.9)
IMM GRANULOCYTES # BLD AUTO: 0.02 10*3/MM3 (ref 0–0.05)
IMM GRANULOCYTES NFR BLD AUTO: 0.3 % (ref 0–0.5)
LYMPHOCYTES # BLD AUTO: 1.68 10*3/MM3 (ref 0.7–3.1)
LYMPHOCYTES NFR BLD AUTO: 26.1 % (ref 19.6–45.3)
MCH RBC QN AUTO: 28.9 PG (ref 26.6–33)
MCHC RBC AUTO-ENTMCNC: 32.7 G/DL (ref 31.5–35.7)
MCV RBC AUTO: 88.2 FL (ref 79–97)
MONOCYTES # BLD AUTO: 0.55 10*3/MM3 (ref 0.1–0.9)
MONOCYTES NFR BLD AUTO: 8.6 % (ref 5–12)
NEUTROPHILS NFR BLD AUTO: 3.6 10*3/MM3 (ref 1.7–7)
NEUTROPHILS NFR BLD AUTO: 56 % (ref 42.7–76)
NRBC BLD AUTO-RTO: 0 /100 WBC (ref 0–0.2)
PLATELET # BLD AUTO: 294 10*3/MM3 (ref 140–450)
PMV BLD AUTO: 10 FL (ref 6–12)
POTASSIUM SERPL-SCNC: 3.6 MMOL/L (ref 3.5–5.2)
PROT SERPL-MCNC: 6.9 G/DL (ref 6–8.5)
RBC # BLD AUTO: 3.74 10*6/MM3 (ref 3.77–5.28)
SODIUM SERPL-SCNC: 142 MMOL/L (ref 136–145)
WBC NRBC COR # BLD: 6.43 10*3/MM3 (ref 3.4–10.8)

## 2023-09-04 PROCEDURE — 85025 COMPLETE CBC W/AUTO DIFF WBC: CPT | Performed by: EMERGENCY MEDICINE

## 2023-09-04 PROCEDURE — 85379 FIBRIN DEGRADATION QUANT: CPT | Performed by: EMERGENCY MEDICINE

## 2023-09-04 PROCEDURE — 73560 X-RAY EXAM OF KNEE 1 OR 2: CPT

## 2023-09-04 PROCEDURE — 80053 COMPREHEN METABOLIC PANEL: CPT | Performed by: EMERGENCY MEDICINE

## 2023-09-04 PROCEDURE — 86140 C-REACTIVE PROTEIN: CPT | Performed by: EMERGENCY MEDICINE

## 2023-09-04 PROCEDURE — 85652 RBC SED RATE AUTOMATED: CPT | Performed by: EMERGENCY MEDICINE

## 2023-09-04 RX ORDER — PREDNISONE 50 MG/1
50 TABLET ORAL DAILY
Qty: 5 TABLET | Refills: 0 | Status: SHIPPED | OUTPATIENT
Start: 2023-09-04

## 2023-09-04 RX ORDER — METAXALONE 800 MG/1
800 TABLET ORAL 3 TIMES DAILY PRN
Qty: 15 TABLET | Refills: 0 | Status: SHIPPED | OUTPATIENT
Start: 2023-09-04

## 2023-09-04 NOTE — ED TRIAGE NOTES
Pt ambulatory to triage from home with c/o right leg pain. States had similar pain in May, but that resolved.  Pt works 10 hour days in [employer]. Pain came back about 1 week ago, becoming unbearable last night.

## 2023-09-04 NOTE — ED PROVIDER NOTES
EMERGENCY DEPARTMENT ENCOUNTER    Room Number:  12/12  PCP: Chio Elizalde MD  Historian: Patient      HPI:  Chief Complaint: Leg pain  A complete HPI/ROS/PMH/PSH/SH/FH are unobtainable due to: None  Context: Ana Reddy is a 48 y.o. female who presents to the ED c/o right leg discomfort that has been present and worsening now for the past 2 days.  She does report some very mild back discomfort but she states that she has a known disc herniation.  She denies any known trauma.  She reports that the pain to her right lower extremity is made worse by touch and movement.  She denies previous history of similar symptoms.  She also denies chest pain, shortness of breath, nausea/vomiting, back pain, or fever/chills.            PAST MEDICAL HISTORY  Active Ambulatory Problems     Diagnosis Date Noted    Primary hypertension 05/11/2023    Radiculopathy due to disorder of intervertebral disc of lumbar spine 05/11/2023    Bipolar 1 disorder 05/11/2023    Major depressive disorder 05/11/2023    Seasonal allergies 05/11/2023    Vitamin D deficiency 05/11/2023    Morbid obesity with BMI of 45.0-49.9, adult 05/11/2023    Ingrowing nail, right great toe 05/11/2023    Impaired fasting glucose 08/23/2023    Primary osteoarthritis of both knees 08/23/2023    Trigger middle finger of right hand 08/23/2023    Pure hypercholesterolemia 08/23/2023     Resolved Ambulatory Problems     Diagnosis Date Noted    Chest discomfort 05/22/2023    Chest pain 05/23/2023    Rash in adult 06/23/2023     Past Medical History:   Diagnosis Date    Allergic 3/1992    Anxiety     Arthritis     Asthma     Depression     Headache 1989    Hypertension     Low back pain          PAST SURGICAL HISTORY  Past Surgical History:   Procedure Laterality Date    CHOLECYSTECTOMY      ELBOW / UPPER ARM FOREIGN BODY REMOVAL Right     TUBAL ABDOMINAL LIGATION           FAMILY HISTORY  Family History   Problem Relation Age of Onset    Arthritis Mother      Hypertension Mother     Learning disabilities Mother     Thyroid disease Mother     Cancer Maternal Grandfather     Depression Maternal Grandfather     Mental illness Maternal Grandfather          SOCIAL HISTORY  Social History     Socioeconomic History    Marital status: Significant Other   Tobacco Use    Smoking status: Former     Packs/day: 0.25     Years: 1.00     Pack years: 0.25     Types: Cigarettes     Start date: 1992     Quit date: 2009     Years since quittin.3    Smokeless tobacco: Never   Vaping Use    Vaping Use: Never used   Substance and Sexual Activity    Alcohol use: Yes     Alcohol/week: 2.0 standard drinks     Types: 1 Glasses of wine, 1 Drinks containing 0.5 oz of alcohol per week     Comment: socially    Drug use: Not Currently     Types: Marijuana    Sexual activity: Yes     Partners: Male     Birth control/protection: None         ALLERGIES  Latex and Penicillins        REVIEW OF SYSTEMS  Review of Systems   Constitutional:  Negative for fever.   HENT:  Negative for sore throat.    Eyes: Negative.    Respiratory:  Negative for cough and shortness of breath.    Cardiovascular:  Negative for chest pain.   Gastrointestinal:  Negative for abdominal pain, diarrhea and vomiting.   Genitourinary:  Negative for dysuria.   Musculoskeletal:  Negative for neck pain.        Right leg/calf pain   Skin:  Negative for rash.   Allergic/Immunologic: Negative.    Neurological:  Negative for weakness, numbness and headaches.   Hematological: Negative.    Psychiatric/Behavioral: Negative.     All other systems reviewed and are negative.         PHYSICAL EXAM  ED Triage Vitals   Temp Pulse Resp BP SpO2   -- -- -- -- --      Temp src Heart Rate Source Patient Position BP Location FiO2 (%)   -- -- -- -- --       Physical Exam  Constitutional:       General: She is not in acute distress.     Appearance: Normal appearance. She is not ill-appearing or toxic-appearing.   HENT:      Head: Normocephalic  and atraumatic.   Eyes:      Extraocular Movements: Extraocular movements intact.      Pupils: Pupils are equal, round, and reactive to light.   Cardiovascular:      Rate and Rhythm: Normal rate and regular rhythm.      Heart sounds: No murmur heard.    No friction rub. No gallop.   Pulmonary:      Effort: Pulmonary effort is normal.      Breath sounds: Normal breath sounds.   Abdominal:      General: Abdomen is flat. There is no distension.      Palpations: Abdomen is soft.      Tenderness: There is no abdominal tenderness.   Musculoskeletal:         General: Swelling and tenderness present. Normal range of motion.      Cervical back: Normal range of motion and neck supple.      Comments: Right anterior leg/calf   Skin:     General: Skin is warm and dry.   Neurological:      General: No focal deficit present.      Mental Status: She is alert and oriented to person, place, and time.      Sensory: No sensory deficit.      Motor: No weakness.   Psychiatric:         Mood and Affect: Mood normal.         Behavior: Behavior normal.           Vital signs and nursing notes reviewed.          LAB RESULTS  Recent Results (from the past 24 hour(s))   Comprehensive Metabolic Panel    Collection Time: 09/04/23 12:35 AM    Specimen: Arm, Left; Blood   Result Value Ref Range    Glucose 111 (H) 65 - 99 mg/dL    BUN 23 (H) 6 - 20 mg/dL    Creatinine 0.84 0.57 - 1.00 mg/dL    Sodium 142 136 - 145 mmol/L    Potassium 3.6 3.5 - 5.2 mmol/L    Chloride 106 98 - 107 mmol/L    CO2 25.0 22.0 - 29.0 mmol/L    Calcium 9.4 8.6 - 10.5 mg/dL    Total Protein 6.9 6.0 - 8.5 g/dL    Albumin 4.4 3.5 - 5.2 g/dL    ALT (SGPT) 22 1 - 33 U/L    AST (SGOT) 20 1 - 32 U/L    Alkaline Phosphatase 68 39 - 117 U/L    Total Bilirubin 0.2 0.0 - 1.2 mg/dL    Globulin 2.5 gm/dL    A/G Ratio 1.8 g/dL    BUN/Creatinine Ratio 27.4 (H) 7.0 - 25.0    Anion Gap 11.0 5.0 - 15.0 mmol/L    eGFR 85.3 >60.0 mL/min/1.73   Sedimentation Rate    Collection Time: 09/04/23  12:35 AM    Specimen: Arm, Left; Blood   Result Value Ref Range    Sed Rate 21 (H) 0 - 20 mm/hr   C-reactive Protein    Collection Time: 09/04/23 12:35 AM    Specimen: Arm, Left; Blood   Result Value Ref Range    C-Reactive Protein 0.47 0.00 - 0.50 mg/dL   D-dimer, Quantitative    Collection Time: 09/04/23 12:35 AM    Specimen: Arm, Left; Blood   Result Value Ref Range    D-Dimer, Quantitative 0.47 0.00 - 0.50 MCGFEU/mL   CBC Auto Differential    Collection Time: 09/04/23 12:35 AM    Specimen: Arm, Left; Blood   Result Value Ref Range    WBC 6.43 3.40 - 10.80 10*3/mm3    RBC 3.74 (L) 3.77 - 5.28 10*6/mm3    Hemoglobin 10.8 (L) 12.0 - 15.9 g/dL    Hematocrit 33.0 (L) 34.0 - 46.6 %    MCV 88.2 79.0 - 97.0 fL    MCH 28.9 26.6 - 33.0 pg    MCHC 32.7 31.5 - 35.7 g/dL    RDW 14.2 12.3 - 15.4 %    RDW-SD 46.2 37.0 - 54.0 fl    MPV 10.0 6.0 - 12.0 fL    Platelets 294 140 - 450 10*3/mm3    Neutrophil % 56.0 42.7 - 76.0 %    Lymphocyte % 26.1 19.6 - 45.3 %    Monocyte % 8.6 5.0 - 12.0 %    Eosinophil % 8.2 (H) 0.3 - 6.2 %    Basophil % 0.8 0.0 - 1.5 %    Immature Grans % 0.3 0.0 - 0.5 %    Neutrophils, Absolute 3.60 1.70 - 7.00 10*3/mm3    Lymphocytes, Absolute 1.68 0.70 - 3.10 10*3/mm3    Monocytes, Absolute 0.55 0.10 - 0.90 10*3/mm3    Eosinophils, Absolute 0.53 (H) 0.00 - 0.40 10*3/mm3    Basophils, Absolute 0.05 0.00 - 0.20 10*3/mm3    Immature Grans, Absolute 0.02 0.00 - 0.05 10*3/mm3    nRBC 0.0 0.0 - 0.2 /100 WBC       Ordered the above labs and reviewed the results.        RADIOLOGY  XR Knee 1 or 2 View Right    Result Date: 9/4/2023  2 VIEWS RIGHT KNEE  HISTORY: Right knee pain  COMPARISON: None available.  FINDINGS: No acute fracture or subluxation of the right knee is seen. The patient does have a suprapatellar effusion. Degenerative changes are most significant within the patellofemoral compartment      Suprapatellar effusion.  This report was finalized on 9/4/2023 12:46 AM by Dr. Brittani Madrigal M.D.        Ordered the above noted radiological studies. Reviewed by me in PACS.            PROCEDURES  Procedures          MEDICATIONS GIVEN IN ER  Medications - No data to display                MEDICAL DECISION MAKING, PROGRESS, and CONSULTS    All labs have been independently reviewed by me.  All radiology studies have been reviewed by me and I have also reviewed the radiology report.   EKG's independently viewed and interpreted by me.  Discussion below represents my analysis of pertinent findings related to patient's condition, differential diagnosis, treatment plan and final disposition.      Additional sources:  - Discussed/ obtained information from independent historians: History obtained from the patient herself at bedside.    - External (non-ED) record review: Upon medical records review, the patient was last seen and evaluated in the outpatient office of her primary care provider on 8/23/2023 in follow-up for her known primary hypertension as well as impaired fasting glucose test.    - Chronic or social conditions impacting care: None reported    - Shared decision making: Discharge decision based on shared conversations have between myself as well as the patient at bedside.      Orders placed during this visit:  Orders Placed This Encounter   Procedures    XR Knee 1 or 2 View Right    Comprehensive Metabolic Panel    Sedimentation Rate    C-reactive Protein    D-dimer, Quantitative    CBC Auto Differential    CBC & Differential               Differential diagnosis includes but is not limited to:    Differential diagnosis includes but is not limited to lumbar radiculopathy, acute sciatica, DVT, SVT, cellulitis of the right lower extremity, compartment syndrome, or musculoskeletal pain.      Independent interpretation of labs, radiology studies, and discussions with consultants:    X-ray of the right knee was independently interpreted by myself with my interpretation showing no obvious soft tissue swelling nor  area of bony destruction or fracture.        ED Course as of 09/04/23 0651   Mon Sep 04, 2023   0225 On reevaluation, the patient is resting comfortably and without acute complaint.  I did inform her that her work-up in the ED was essentially unremarkable including x-ray, WBC count, as well as a negative D-dimer.  We will place her on a short course of prednisone as well as a muscle relaxer and have her follow-up with her primary care provider.  All questions have been answered. [BM]      ED Course User Index  [BM] Kyree Pierson MD             DIAGNOSIS  Final diagnoses:   Acute leg pain, right   Lumbar radiculopathy, right         DISPOSITION  DISCHARGE    Patient discharged in stable condition.    Reviewed implications of results, diagnosis, meds, responsibility to follow up, warning signs and symptoms of possible worsening, potential complications and reasons to return to ER.    Patient/Family voiced understanding of above instructions.    Discussed plan for discharge, as there is no emergent indication for admission. Patient referred to primary care provider for BP management due to today's BP. Pt/family is agreeable and understands need for follow up and repeat testing.  Pt is aware that discharge does not mean that nothing is wrong but it indicates no emergency is present that requires admission and they must continue care with follow-up as given below or physician of their choice.     FOLLOW-UP  Chio Elizalde MD  [address] [place] [address] [phone]    Schedule an appointment as soon as possible for a visit            Medication List        New Prescriptions      metaxalone 800 MG tablet  Commonly known as: SKELAXIN  Take 1 tablet by mouth 3 (Three) Times a Day As Needed for Muscle Spasms.     predniSONE 50 MG tablet  Commonly known as: DELTASONE  Take 1 tablet by mouth Daily.               Where to Get Your Medications        These medications were sent to [facility] [address] [place] [address] - [address] [facility] - [phone]  - [phone]   [address] [facility] [address]      Phone: [phone]   metaxalone 800 MG tablet  predniSONE 50 MG tablet                   Latest Documented Vital Signs:  As of 06:51 EDT  BP- 128/89 HR- 98 Temp- 97.8 °F (36.6 °C) (Tympanic) O2 sat- 97%              --    Please note that portions of this were completed with a voice recognition program.       Note Disclaimer: At [facility], we believe that sharing information builds trust and better relationships. You are receiving this note because you are receiving care at [facility] or recently visited. It is possible you will see health information before a provider has talked with you about it. This kind of information can be easy to misunderstand. To help you fully understand what it means for your health, we urge you to discuss this note with your provider.             Kyree Pierson MD  09/04/23 0651

## 2023-10-25 ENCOUNTER — PATIENT MESSAGE (OUTPATIENT)
Dept: INTERNAL MEDICINE | Facility: CLINIC | Age: 48
End: 2023-10-25
Payer: COMMERCIAL

## 2023-11-02 RX ORDER — POTASSIUM CHLORIDE 750 MG/1
10 TABLET, EXTENDED RELEASE ORAL DAILY
Qty: 30 TABLET | Refills: 2 | Status: SHIPPED | OUTPATIENT
Start: 2023-11-02

## 2023-11-02 NOTE — TELEPHONE ENCOUNTER
From: Ana Reddy  To: Chio Elizalde  Sent: 10/25/2023 12:49 PM EDT  Subject: Hello,    I'm needing a refill on my potassium chloride 10meg.  Also in the oralia it's saying I can't request refills on most of my medicine is there a reason for this?    If I need to come in please let me know.    Thanks,  Ana Reddy

## 2024-01-09 RX ORDER — ERGOCALCIFEROL 1.25 MG/1
50000 CAPSULE ORAL WEEKLY
Qty: 30 CAPSULE | Refills: 2 | Status: SHIPPED | OUTPATIENT
Start: 2024-01-09

## 2024-01-17 ENCOUNTER — OFFICE VISIT (OUTPATIENT)
Dept: INTERNAL MEDICINE | Facility: CLINIC | Age: 49
End: 2024-01-17
Payer: COMMERCIAL

## 2024-01-17 VITALS
DIASTOLIC BLOOD PRESSURE: 76 MMHG | HEIGHT: 61 IN | TEMPERATURE: 97 F | HEART RATE: 70 BPM | OXYGEN SATURATION: 99 % | SYSTOLIC BLOOD PRESSURE: 122 MMHG | WEIGHT: 239.13 LBS | RESPIRATION RATE: 18 BRPM | BODY MASS INDEX: 45.15 KG/M2

## 2024-01-17 DIAGNOSIS — I10 PRIMARY HYPERTENSION: ICD-10-CM

## 2024-01-17 DIAGNOSIS — R82.90 ABNORMAL URINE ODOR: ICD-10-CM

## 2024-01-17 DIAGNOSIS — E78.00 PURE HYPERCHOLESTEROLEMIA: ICD-10-CM

## 2024-01-17 DIAGNOSIS — E55.9 VITAMIN D DEFICIENCY: ICD-10-CM

## 2024-01-17 DIAGNOSIS — R73.01 IMPAIRED FASTING GLUCOSE: ICD-10-CM

## 2024-01-17 DIAGNOSIS — N95.1 PERIMENOPAUSAL SYMPTOMS: ICD-10-CM

## 2024-01-17 DIAGNOSIS — R53.83 OTHER FATIGUE: ICD-10-CM

## 2024-01-17 DIAGNOSIS — N30.00 ACUTE CYSTITIS WITHOUT HEMATURIA: Primary | ICD-10-CM

## 2024-01-17 LAB
25(OH)D3 SERPL-MCNC: 49.4 NG/ML (ref 30–100)
ALBUMIN SERPL-MCNC: 4.6 G/DL (ref 3.5–5.2)
ALBUMIN/GLOB SERPL: 1.6 G/DL
ALP SERPL-CCNC: 74 U/L (ref 39–117)
ALT SERPL W P-5'-P-CCNC: 18 U/L (ref 1–33)
ANION GAP SERPL CALCULATED.3IONS-SCNC: 12.8 MMOL/L (ref 5–15)
AST SERPL-CCNC: 21 U/L (ref 1–32)
BACTERIA UR QL AUTO: ABNORMAL /HPF
BASOPHILS # BLD AUTO: 0.05 10*3/MM3 (ref 0–0.2)
BASOPHILS NFR BLD AUTO: 0.7 % (ref 0–1.5)
BILIRUB SERPL-MCNC: 0.5 MG/DL (ref 0–1.2)
BILIRUB UR QL STRIP: NEGATIVE
BUN SERPL-MCNC: 26 MG/DL (ref 6–20)
BUN/CREAT SERPL: 40.6 (ref 7–25)
CALCIUM SPEC-SCNC: 9.7 MG/DL (ref 8.6–10.5)
CHLORIDE SERPL-SCNC: 98 MMOL/L (ref 98–107)
CHOLEST SERPL-MCNC: 147 MG/DL (ref 0–200)
CLARITY UR: ABNORMAL
CO2 SERPL-SCNC: 26.2 MMOL/L (ref 22–29)
COLOR UR: ABNORMAL
CREAT SERPL-MCNC: 0.64 MG/DL (ref 0.57–1)
DEPRECATED RDW RBC AUTO: 42 FL (ref 37–54)
EGFRCR SERPLBLD CKD-EPI 2021: 108.5 ML/MIN/1.73
EOSINOPHIL # BLD AUTO: 0.03 10*3/MM3 (ref 0–0.4)
EOSINOPHIL NFR BLD AUTO: 0.4 % (ref 0.3–6.2)
ERYTHROCYTE [DISTWIDTH] IN BLOOD BY AUTOMATED COUNT: 13.2 % (ref 12.3–15.4)
GLOBULIN UR ELPH-MCNC: 2.8 GM/DL
GLUCOSE SERPL-MCNC: 93 MG/DL (ref 65–99)
GLUCOSE UR STRIP-MCNC: NEGATIVE MG/DL
HCT VFR BLD AUTO: 35.4 % (ref 34–46.6)
HDLC SERPL-MCNC: 53 MG/DL (ref 40–60)
HGB BLD-MCNC: 12 G/DL (ref 12–15.9)
HGB UR QL STRIP.AUTO: ABNORMAL
HYALINE CASTS UR QL AUTO: ABNORMAL /LPF
IMM GRANULOCYTES # BLD AUTO: 0.01 10*3/MM3 (ref 0–0.05)
IMM GRANULOCYTES NFR BLD AUTO: 0.1 % (ref 0–0.5)
IRON 24H UR-MRATE: 83 MCG/DL (ref 37–145)
IRON SATN MFR SERPL: 21 % (ref 20–50)
KETONES UR QL STRIP: ABNORMAL
LDLC SERPL CALC-MCNC: 82 MG/DL (ref 0–100)
LDLC/HDLC SERPL: 1.55 {RATIO}
LEUKOCYTE ESTERASE UR QL STRIP.AUTO: NEGATIVE
LYMPHOCYTES # BLD AUTO: 1.94 10*3/MM3 (ref 0.7–3.1)
LYMPHOCYTES NFR BLD AUTO: 25.4 % (ref 19.6–45.3)
MCH RBC QN AUTO: 29.8 PG (ref 26.6–33)
MCHC RBC AUTO-ENTMCNC: 33.9 G/DL (ref 31.5–35.7)
MCV RBC AUTO: 87.8 FL (ref 79–97)
MONOCYTES # BLD AUTO: 0.61 10*3/MM3 (ref 0.1–0.9)
MONOCYTES NFR BLD AUTO: 8 % (ref 5–12)
NEUTROPHILS NFR BLD AUTO: 5.01 10*3/MM3 (ref 1.7–7)
NEUTROPHILS NFR BLD AUTO: 65.4 % (ref 42.7–76)
NITRITE UR QL STRIP: POSITIVE
NRBC BLD AUTO-RTO: 0 /100 WBC (ref 0–0.2)
PH UR STRIP.AUTO: 5.5 [PH] (ref 5–8)
PLATELET # BLD AUTO: 326 10*3/MM3 (ref 140–450)
PMV BLD AUTO: 11.2 FL (ref 6–12)
POTASSIUM SERPL-SCNC: 3.4 MMOL/L (ref 3.5–5.2)
PROT SERPL-MCNC: 7.4 G/DL (ref 6–8.5)
PROT UR QL STRIP: NEGATIVE
RBC # BLD AUTO: 4.03 10*6/MM3 (ref 3.77–5.28)
RBC # UR STRIP: ABNORMAL /HPF
REF LAB TEST METHOD: ABNORMAL
SODIUM SERPL-SCNC: 137 MMOL/L (ref 136–145)
SP GR UR STRIP: >=1.03 (ref 1–1.03)
SQUAMOUS #/AREA URNS HPF: ABNORMAL /HPF
T4 FREE SERPL-MCNC: 1.53 NG/DL (ref 0.93–1.7)
TIBC SERPL-MCNC: 399 MCG/DL (ref 298–536)
TRANSFERRIN SERPL-MCNC: 268 MG/DL (ref 200–360)
TRIGL SERPL-MCNC: 60 MG/DL (ref 0–150)
TSH SERPL DL<=0.05 MIU/L-ACNC: 4.95 UIU/ML (ref 0.27–4.2)
UROBILINOGEN UR QL STRIP: ABNORMAL
VIT B12 BLD-MCNC: 1061 PG/ML (ref 211–946)
VLDLC SERPL-MCNC: 12 MG/DL (ref 5–40)
WBC # UR STRIP: ABNORMAL /HPF
WBC NRBC COR # BLD AUTO: 7.65 10*3/MM3 (ref 3.4–10.8)

## 2024-01-17 PROCEDURE — 80050 GENERAL HEALTH PANEL: CPT | Performed by: INTERNAL MEDICINE

## 2024-01-17 PROCEDURE — 87086 URINE CULTURE/COLONY COUNT: CPT | Performed by: INTERNAL MEDICINE

## 2024-01-17 PROCEDURE — 82306 VITAMIN D 25 HYDROXY: CPT | Performed by: INTERNAL MEDICINE

## 2024-01-17 PROCEDURE — 84466 ASSAY OF TRANSFERRIN: CPT | Performed by: INTERNAL MEDICINE

## 2024-01-17 PROCEDURE — 87186 SC STD MICRODIL/AGAR DIL: CPT | Performed by: INTERNAL MEDICINE

## 2024-01-17 PROCEDURE — 80061 LIPID PANEL: CPT | Performed by: INTERNAL MEDICINE

## 2024-01-17 PROCEDURE — 83540 ASSAY OF IRON: CPT | Performed by: INTERNAL MEDICINE

## 2024-01-17 PROCEDURE — 81001 URINALYSIS AUTO W/SCOPE: CPT | Performed by: INTERNAL MEDICINE

## 2024-01-17 PROCEDURE — 82607 VITAMIN B-12: CPT | Performed by: INTERNAL MEDICINE

## 2024-01-17 PROCEDURE — 84439 ASSAY OF FREE THYROXINE: CPT | Performed by: INTERNAL MEDICINE

## 2024-01-17 PROCEDURE — 87077 CULTURE AEROBIC IDENTIFY: CPT | Performed by: INTERNAL MEDICINE

## 2024-01-17 RX ORDER — NITROFURANTOIN 25; 75 MG/1; MG/1
100 CAPSULE ORAL 2 TIMES DAILY
Qty: 14 CAPSULE | Refills: 0 | Status: SHIPPED | OUTPATIENT
Start: 2024-01-17 | End: 2024-01-17 | Stop reason: SDUPTHER

## 2024-01-17 RX ORDER — NITROFURANTOIN 25; 75 MG/1; MG/1
100 CAPSULE ORAL 2 TIMES DAILY
Qty: 14 CAPSULE | Refills: 0 | Status: SHIPPED | OUTPATIENT
Start: 2024-01-17 | End: 2024-01-24

## 2024-01-17 RX ORDER — PAROXETINE 10 MG/1
10 TABLET, FILM COATED ORAL EVERY MORNING
Qty: 90 TABLET | Refills: 1 | Status: SHIPPED | OUTPATIENT
Start: 2024-01-17

## 2024-01-17 RX ORDER — CELECOXIB 100 MG/1
100 CAPSULE ORAL 2 TIMES DAILY PRN
Qty: 180 CAPSULE | Refills: 0 | Status: SHIPPED | OUTPATIENT
Start: 2024-01-17

## 2024-01-17 NOTE — PROGRESS NOTES
"Chief Complaint  Arm Pain (Right Arm and right knee pain mainly and some in the left knee, also has no energy and very fatigued, all she wants to do is sleep, did get an injection at pain management but it did not give her much relief, she is going to call to them see what the next steps are, sometimes when she urinates randomly will have a strong odor, also has been having nose bleeds randomly for about a month and was having light headed spells with them. ) and Med Refill    Subjective       Ana Reddy presents to [facility]    Arm Pain        Presenting for follow up.     Having increased arthritis symptoms. Currently taking oral diclofenac and using Voltaren gel.     C/O strong odor to her urine.     C/O mood changes which she attributes to perimenopause. She has not had a period since May 2023.       Objective     Vitals:    01/17/24 1042   BP: 122/76   BP Location: Left arm   Patient Position: Sitting   Cuff Size: Adult   Pulse: 70   Resp: 18   Temp: 97 °F (36.1 °C)   TempSrc: Temporal   SpO2: 99%   Weight: 108 kg (239 lb 2 oz)   Height: 154.9 cm (61\")      Wt Readings from Last 3 Encounters:   01/17/24 108 kg (239 lb 2 oz)   09/03/23 114 kg (252 lb)   08/23/23 114 kg (252 lb 4 oz)      BP Readings from Last 3 Encounters:   01/17/24 122/76   09/04/23 128/89   08/23/23 124/74        Body mass index is 45.18 kg/m².      Physical Exam  Vitals reviewed.   Constitutional:       Appearance: Normal appearance. She is well-developed.   HENT:      Head: Normocephalic and atraumatic.      Mouth/Throat:      Pharynx: No oropharyngeal exudate.   Eyes:      Conjunctiva/sclera: Conjunctivae normal.      Pupils: Pupils are equal, round, and reactive to light.   Neck:      Thyroid: No thyromegaly or thyroid tenderness.   Cardiovascular:      Rate and Rhythm: Normal rate and regular rhythm.      Heart sounds: No murmur heard.     No friction rub. No gallop.   Pulmonary: "      Effort: Pulmonary effort is normal.      Breath sounds: Normal breath sounds. No wheezing or rhonchi.   Lymphadenopathy:      Cervical: No cervical adenopathy.   Skin:     General: Skin is warm and dry.   Neurological:      Mental Status: She is alert and oriented to person, place, and time.   Psychiatric:         Mood and Affect: Affect normal.          Result Review :   The following data was reviewed by: Chio Elizalde MD on 01/17/2024:  CMP          5/23/2023    04:26 8/23/2023    11:46 9/4/2023    00:35   CMP   Glucose 95  99  111    BUN 16  22  23    Creatinine 0.62  0.65  0.84    EGFR 109.3  108.1  85.3    Sodium 141  142  142    Potassium 3.8  3.7  3.6    Chloride 105  104  106    Calcium 8.8  9.4  9.4    Total Protein  7.0  6.9    Albumin  4.2  4.4    Globulin  2.8  2.5    Total Bilirubin  <0.2  0.2    Alkaline Phosphatase  62  68    AST (SGOT)  21  20    ALT (SGPT)  24  22    Albumin/Globulin Ratio  1.5  1.8    BUN/Creatinine Ratio 25.8  33.8  27.4    Anion Gap 10.9  13.3  11.0      CBC          5/23/2023    04:26 8/23/2023    11:46 9/4/2023    00:35   CBC   WBC 5.59  6.22  6.43    RBC 3.63  3.87  3.74    Hemoglobin 10.4  11.4  10.8    Hematocrit 31.5  33.9  33.0    MCV 86.8  87.6  88.2    MCH 28.7  29.5  28.9    MCHC 33.0  33.6  32.7    RDW 13.6  13.7  14.2    Platelets 281  291  294      Lipid Panel          5/11/2023    11:06 5/23/2023    04:26 8/23/2023    11:46   Lipid Panel   Total Cholesterol  124  153    Total Cholesterol 208      Triglycerides 112  104  107    HDL Cholesterol 43  43  41    VLDL Cholesterol 20  19  20    LDL Cholesterol  145  62  92    LDL/HDL Ratio  1.40  2.21      TSH          5/11/2023    11:06 8/23/2023    11:46   TSH   TSH 3.210  2.780          Procedures  Assessment & Plan  Acute cystitis without hematuria  Will treat empirically with Macrobid.   Will send urine for culture and adjust treatment if needed  Abnormal urine odor    Impaired fasting  glucose  Checking follow up labs today  Primary hypertension  Well controlled in clinic today  Continue current management  Pure hypercholesterolemia  On statin, tolerating well  Checking follow up labs today  Other fatigue  Checking vitamin and iron labs  Vitamin D deficiency  Checking level today  Perimenopausal symptoms  Will start Paxil for symptoms  Orders Placed This Encounter   Procedures    Urine Culture - Urine, Urine, Clean Catch    Urinalysis With Culture If Indicated - Urine, Clean Catch    Comprehensive Metabolic Panel    TSH    Lipid Panel    T4, Free    Iron Profile    Vitamin B12    Vitamin D,25-Hydroxy    Urinalysis, Microscopic Only - Urine, Clean Catch    CBC Auto Differential    CBC & Differential     New Medications Ordered This Visit   Medications    celecoxib (CeleBREX) 100 MG capsule     Sig: Take 1 capsule by mouth 2 (Two) Times a Day As Needed for Mild Pain.     Dispense:  180 capsule     Refill:  0    nitrofurantoin, macrocrystal-monohydrate, (Macrobid) 100 MG capsule     Sig: Take 1 capsule by mouth 2 (Two) Times a Day for 7 days.     Dispense:  14 capsule     Refill:  0    PARoxetine (Paxil) 10 MG tablet     Sig: Take 1 tablet by mouth Every Morning.     Dispense:  90 tablet     Refill:  1           Follow Up   Return in about 3 months (around 4/17/2024) for or sooner if needed.  Patient was given instructions and counseling regarding her condition or for health maintenance advice. Please see specific information pulled into the AVS if appropriate.

## 2024-01-19 LAB — BACTERIA SPEC AEROBE CULT: ABNORMAL

## 2024-01-23 NOTE — TELEPHONE ENCOUNTER
Caller: [facility], [place] - [address] [phone] [facility] [phone] FX    Relationship: Pharmacy    Best call back number: [phone]     Requested Prescriptions:   Requested Prescriptions     Pending Prescriptions Disp Refills    potassium chloride (K-DUR,KLOR-CON) 10 MEQ CR tablet 30 tablet 2     Sig: Take 1 tablet by mouth Daily.    benazepril (LOTENSIN) 10 MG tablet 90 tablet 0     Sig: Take 1 tablet by mouth Daily.        Pharmacy where request should be sent: [facility], [place] - [address] [phone] [facility] [phone] FX     Last office visit with prescribing clinician: 1/17/2024   Last telemedicine visit with prescribing clinician: Visit date not found   Next office visit with prescribing clinician: Visit date not found     Does the patient have less than a 3 day supply:  [] Yes  [x] No    Would you like a call back once the refill request has been completed: [] Yes [x] No    If the office needs to give you a call back, can they leave a voicemail: [] Yes [x] No    Kaylene Aguilar Rep   01/23/24 14:51 EST

## 2024-01-24 RX ORDER — BENAZEPRIL HYDROCHLORIDE 10 MG/1
10 TABLET ORAL DAILY
Qty: 90 TABLET | Refills: 0 | Status: SHIPPED | OUTPATIENT
Start: 2024-01-24

## 2024-01-24 RX ORDER — POTASSIUM CHLORIDE 750 MG/1
10 TABLET, EXTENDED RELEASE ORAL DAILY
Qty: 30 TABLET | Refills: 2 | Status: SHIPPED | OUTPATIENT
Start: 2024-01-24

## 2024-01-24 RX ORDER — NITROFURANTOIN 25; 75 MG/1; MG/1
100 CAPSULE ORAL 2 TIMES DAILY
Qty: 14 CAPSULE | Refills: 0 | Status: SHIPPED | OUTPATIENT
Start: 2024-01-24 | End: 2024-01-31

## 2024-03-11 RX ORDER — POTASSIUM CHLORIDE 750 MG/1
10 TABLET, EXTENDED RELEASE ORAL DAILY
Qty: 30 TABLET | Refills: 10 | Status: SHIPPED | OUTPATIENT
Start: 2024-03-11

## 2024-03-12 RX ORDER — POTASSIUM CHLORIDE 750 MG/1
10 TABLET, EXTENDED RELEASE ORAL DAILY
Qty: 30 TABLET | Refills: 10 | OUTPATIENT
Start: 2024-03-12

## 2024-05-23 ENCOUNTER — OFFICE VISIT (OUTPATIENT)
Dept: INTERNAL MEDICINE | Facility: CLINIC | Age: 49
End: 2024-05-23
Payer: COMMERCIAL

## 2024-05-23 VITALS
BODY MASS INDEX: 45.69 KG/M2 | TEMPERATURE: 97.1 F | SYSTOLIC BLOOD PRESSURE: 122 MMHG | WEIGHT: 242 LBS | RESPIRATION RATE: 18 BRPM | DIASTOLIC BLOOD PRESSURE: 78 MMHG | HEART RATE: 69 BPM | HEIGHT: 61 IN | OXYGEN SATURATION: 96 %

## 2024-05-23 DIAGNOSIS — F32.4 MAJOR DEPRESSIVE DISORDER IN PARTIAL REMISSION, UNSPECIFIED WHETHER RECURRENT: ICD-10-CM

## 2024-05-23 DIAGNOSIS — E78.00 PURE HYPERCHOLESTEROLEMIA: ICD-10-CM

## 2024-05-23 DIAGNOSIS — I10 PRIMARY HYPERTENSION: Primary | ICD-10-CM

## 2024-05-23 PROCEDURE — 99214 OFFICE O/P EST MOD 30 MIN: CPT | Performed by: INTERNAL MEDICINE

## 2024-05-23 RX ORDER — METFORMIN HYDROCHLORIDE 500 MG/1
500 TABLET, EXTENDED RELEASE ORAL
Qty: 90 TABLET | Refills: 2 | Status: SHIPPED | OUTPATIENT
Start: 2024-05-23

## 2024-05-23 RX ORDER — POTASSIUM CHLORIDE 750 MG/1
10 TABLET, EXTENDED RELEASE ORAL DAILY
Qty: 30 TABLET | Refills: 10 | Status: SHIPPED | OUTPATIENT
Start: 2024-05-23

## 2024-05-23 RX ORDER — ATORVASTATIN CALCIUM 10 MG/1
10 TABLET, FILM COATED ORAL DAILY
Qty: 90 TABLET | Refills: 2 | Status: SHIPPED | OUTPATIENT
Start: 2024-05-23

## 2024-05-23 RX ORDER — GABAPENTIN 100 MG/1
100 CAPSULE ORAL
COMMUNITY
Start: 2024-05-21

## 2024-05-23 RX ORDER — AMLODIPINE BESYLATE 10 MG/1
10 TABLET ORAL DAILY
Qty: 90 TABLET | Refills: 2 | Status: SHIPPED | OUTPATIENT
Start: 2024-05-23

## 2024-05-23 RX ORDER — ERGOCALCIFEROL 1.25 MG/1
50000 CAPSULE ORAL WEEKLY
Qty: 30 CAPSULE | Refills: 2 | Status: SHIPPED | OUTPATIENT
Start: 2024-05-23

## 2024-05-23 RX ORDER — BENAZEPRIL HYDROCHLORIDE 10 MG/1
10 TABLET ORAL DAILY
Qty: 90 TABLET | Refills: 0 | Status: SHIPPED | OUTPATIENT
Start: 2024-05-23 | End: 2024-05-23

## 2024-05-23 RX ORDER — ACETAMINOPHEN AND CODEINE PHOSPHATE 300; 30 MG/1; MG/1
1 TABLET ORAL EVERY 12 HOURS SCHEDULED
COMMUNITY
Start: 2024-05-21

## 2024-05-23 RX ORDER — POTASSIUM CHLORIDE 750 MG/1
1 TABLET, FILM COATED, EXTENDED RELEASE ORAL DAILY
COMMUNITY
Start: 2024-01-31 | End: 2024-05-23 | Stop reason: SDUPTHER

## 2024-05-23 RX ORDER — PAROXETINE HYDROCHLORIDE 20 MG/1
20 TABLET, FILM COATED ORAL EVERY MORNING
Qty: 30 TABLET | Refills: 1 | Status: SHIPPED | OUTPATIENT
Start: 2024-05-23

## 2024-05-23 RX ORDER — HYDROCHLOROTHIAZIDE 12.5 MG/1
12.5 CAPSULE, GELATIN COATED ORAL DAILY
Qty: 90 CAPSULE | Refills: 2 | Status: SHIPPED | OUTPATIENT
Start: 2024-05-23

## 2024-05-23 RX ORDER — CELECOXIB 100 MG/1
100 CAPSULE ORAL 2 TIMES DAILY PRN
Qty: 180 CAPSULE | Refills: 0 | Status: SHIPPED | OUTPATIENT
Start: 2024-05-23

## 2024-05-23 RX ORDER — PAROXETINE 10 MG/1
10 TABLET, FILM COATED ORAL EVERY MORNING
Qty: 90 TABLET | Refills: 1 | Status: SHIPPED | OUTPATIENT
Start: 2024-05-23 | End: 2024-05-23

## 2024-05-23 RX ORDER — LISINOPRIL 10 MG/1
10 TABLET ORAL DAILY
Qty: 90 TABLET | Refills: 0 | Status: SHIPPED | OUTPATIENT
Start: 2024-05-23

## 2024-05-24 ENCOUNTER — PRIOR AUTHORIZATION (OUTPATIENT)
Dept: INTERNAL MEDICINE | Facility: CLINIC | Age: 49
End: 2024-05-24
Payer: COMMERCIAL

## 2024-06-03 NOTE — PROGRESS NOTES
"Chief Complaint  referral  (Referral for behavioral health ) and Med Refill (Questions about benazepril )    Subjective      Ana Reddy is a 49 y.o. female who presents to [facility]     Presenting for follow-up    HTN:  well controlled today, doing well on medication, denies headache, chest pain, dizziness, vision changes.  Having trouble finding or affording the benazepril would like to switch to a different medication.    HLD: on statin, tolerating well, denies muscle pain/weakness    Depression: Would like to try increasing her dose of Paxil as she is having some breakthrough symptoms.        Objective   Vital Signs:   Vitals:    05/23/24 1504   BP: 122/78   BP Location: Left arm   Patient Position: Sitting   Cuff Size: Adult   Pulse: 69   Resp: 18   Temp: 97.1 °F (36.2 °C)   TempSrc: Temporal   SpO2: 96%   Weight: 110 kg (242 lb)   Height: 154.9 cm (61\")     Body mass index is 45.73 kg/m².    Wt Readings from Last 3 Encounters:   05/23/24 110 kg (242 lb)   01/17/24 108 kg (239 lb 2 oz)   09/03/23 114 kg (252 lb)     BP Readings from Last 3 Encounters:   05/23/24 122/78   01/17/24 122/76   09/04/23 128/89       Health Maintenance   Topic Date Due    MAMMOGRAM  Never done    COLORECTAL CANCER SCREENING  Never done    Pneumococcal Vaccine 0-64 (1 of 2 - PCV) Never done    TDAP/TD VACCINES (1 - Tdap) Never done    ANNUAL PHYSICAL  Never done    PAP SMEAR  Never done    COVID-19 Vaccine (1 - 2023-24 season) Never done    ZOSTER VACCINE (1 of 2) Never done    BMI FOLLOWUP  05/11/2024    INFLUENZA VACCINE  08/01/2024    LIPID PANEL  01/17/2025    HEPATITIS C SCREENING  Completed       Physical Exam  Vitals reviewed.   Constitutional:       Appearance: Normal appearance. She is well-developed.   HENT:      Head: Normocephalic and atraumatic.      Mouth/Throat:      Pharynx: No oropharyngeal exudate.   Eyes:      Conjunctiva/sclera: Conjunctivae normal.      " Pupils: Pupils are equal, round, and reactive to light.   Neck:      Thyroid: No thyromegaly or thyroid tenderness.   Cardiovascular:      Rate and Rhythm: Normal rate and regular rhythm.      Heart sounds: No murmur heard.     No friction rub. No gallop.   Pulmonary:      Effort: Pulmonary effort is normal.      Breath sounds: Normal breath sounds. No wheezing or rhonchi.   Lymphadenopathy:      Cervical: No cervical adenopathy.   Skin:     General: Skin is warm and dry.   Neurological:      Mental Status: She is alert and oriented to person, place, and time.   Psychiatric:         Mood and Affect: Affect normal.          Result Review :  The following data was reviewed by: Chio Elizalde MD on 05/23/2024:  CMP          8/23/2023    11:46 9/4/2023    00:35 1/17/2024    11:26   CMP   Glucose 99  111  93    BUN 22  23  26    Creatinine 0.65  0.84  0.64    EGFR 108.1  85.3  108.5    Sodium 142  142  137    Potassium 3.7  3.6  3.4    Chloride 104  106  98    Calcium 9.4  9.4  9.7    Total Protein 7.0  6.9  7.4    Albumin 4.2  4.4  4.6    Globulin 2.8  2.5  2.8    Total Bilirubin <0.2  0.2  0.5    Alkaline Phosphatase 62  68  74    AST (SGOT) 21  20  21    ALT (SGPT) 24  22  18    Albumin/Globulin Ratio 1.5  1.8  1.6    BUN/Creatinine Ratio 33.8  27.4  40.6    Anion Gap 13.3  11.0  12.8      CBC          8/23/2023    11:46 9/4/2023    00:35 1/17/2024    11:26   CBC   WBC 6.22  6.43  7.65    RBC 3.87  3.74  4.03    Hemoglobin 11.4  10.8  12.0    Hematocrit 33.9  33.0  35.4    MCV 87.6  88.2  87.8    MCH 29.5  28.9  29.8    MCHC 33.6  32.7  33.9    RDW 13.7  14.2  13.2    Platelets 291  294  326      Lipid Panel          8/23/2023    11:46 1/17/2024    11:26   Lipid Panel   Total Cholesterol 153  147    Triglycerides 107  60    HDL Cholesterol 41  53    VLDL Cholesterol 20  12    LDL Cholesterol  92  82    LDL/HDL Ratio 2.21  1.55      TSH          8/23/2023    11:46 1/17/2024    11:26   TSH   TSH 2.780  4.950       Most Recent A1C          8/23/2023    11:46   HGBA1C Most Recent   Hemoglobin A1C 5.60           Procedures          Assessment & Plan  Primary hypertension  BP well controlled today in clinic  Continue current management  Pure hypercholesterolemia  On statin, tolerating well  Major depressive disorder in partial remission, unspecified whether recurrent  Will increase Paxil dose     New Medications Ordered This Visit   Medications    amLODIPine (NORVASC) 10 MG tablet     Sig: Take 1 tablet by mouth Daily.     Dispense:  90 tablet     Refill:  2    atorvastatin (LIPITOR) 10 MG tablet     Sig: Take 1 tablet by mouth Daily.     Dispense:  90 tablet     Refill:  2    celecoxib (CeleBREX) 100 MG capsule     Sig: Take 1 capsule by mouth 2 (Two) Times a Day As Needed for Mild Pain.     Dispense:  180 capsule     Refill:  0    Cetirizine HCl 10 MG capsule     Sig: Take 10 mg by mouth Daily.     Dispense:  90 capsule     Refill:  3    Diclofenac Sodium (VOLTAREN) 1 % gel gel     Sig: Apply 4 g topically to the appropriate area as directed 3 (Three) Times a Day.     Dispense:  100 g     Refill:  2    hydroCHLOROthiazide (MICROZIDE) 12.5 MG capsule     Sig: Take 1 capsule by mouth Daily.     Dispense:  90 capsule     Refill:  2    metFORMIN ER (GLUCOPHAGE-XR) 500 MG 24 hr tablet     Sig: Take 1 tablet by mouth Daily With Breakfast.     Dispense:  90 tablet     Refill:  2    potassium chloride (KLOR-CON M10) 10 MEQ CR tablet     Sig: Take 1 tablet by mouth Daily.     Dispense:  30 tablet     Refill:  10    vitamin D (ERGOCALCIFEROL) 1.25 MG (98811 UT) capsule capsule     Sig: Take 1 capsule by mouth 1 (One) Time Per Week.     Dispense:  30 capsule     Refill:  2    PARoxetine (Paxil) 20 MG tablet     Sig: Take 1 tablet by mouth Every Morning.     Dispense:  30 tablet     Refill:  1    lisinopril (PRINIVIL,ZESTRIL) 10 MG tablet     Sig: Take 1 tablet by mouth Daily.     Dispense:  90 tablet     Refill:  0                    FOLLOW UP  Return in about 6 weeks (around 7/4/2024) for Next scheduled follow up.  Patient was given instructions and counseling regarding her condition or for health maintenance advice. Please see specific information pulled into the AVS if appropriate.       Chio Elizalde MD  06/03/24  08:53 EDT    CURRENT & DISCONTINUED MEDICATIONS  Current Outpatient Medications   Medication Instructions    acetaminophen (TYLENOL) 500 mg, Oral, Every 6 Hours PRN    acetaminophen-codeine (TYLENOL with CODEINE #3) 300-30 MG per tablet 1 tablet, Oral, Every 12 Hours Scheduled    amLODIPine (NORVASC) 10 mg, Oral, Daily    atorvastatin (LIPITOR) 10 mg, Oral, Daily    celecoxib (CELEBREX) 100 mg, Oral, 2 Times Daily PRN    Cetirizine HCl 10 mg, Oral, Daily    Diclofenac Sodium (VOLTAREN) 4 g, Topical, 3 Times Daily    ferrous sulfate 324 mg, Oral, Daily With Breakfast    gabapentin (NEURONTIN) 400 MG capsule No dose, route, or frequency recorded.    gabapentin (NEURONTIN) 100 mg, Oral, Every Night at Bedtime    hydroCHLOROthiazide (MICROZIDE) 12.5 mg, Oral, Daily    lisinopril (PRINIVIL,ZESTRIL) 10 mg, Oral, Daily    metFORMIN ER (GLUCOPHAGE-XR) 500 mg, Oral, Daily With Breakfast    PARoxetine (PAXIL) 20 mg, Oral, Every Morning    potassium chloride (KLOR-CON M10) 10 MEQ CR tablet 10 mEq, Oral, Daily    vitamin C (ASCORBIC ACID) 250 mg, Oral, Daily    vitamin D (ERGOCALCIFEROL) 50,000 Units, Oral, Weekly       Medications Discontinued During This Encounter   Medication Reason    Cetirizine HCl 10 MG capsule Reorder    amLODIPine (NORVASC) 10 MG tablet Reorder    atorvastatin (LIPITOR) 10 MG tablet Reorder    Diclofenac Sodium (VOLTAREN) 1 % gel gel Reorder    hydroCHLOROthiazide (MICROZIDE) 12.5 MG capsule Reorder    metFORMIN ER (GLUCOPHAGE-XR) 500 MG 24 hr tablet Reorder    vitamin D (ERGOCALCIFEROL) 1.25 MG (10821 UT) capsule capsule Reorder    celecoxib (CeleBREX) 100 MG capsule Reorder    PARoxetine (Paxil) 10  MG tablet Reorder    benazepril (LOTENSIN) 10 MG tablet Reorder    potassium chloride (K-DUR,KLOR-CON,KLOR-CON M10) 10 MEQ CR tablet Reorder    potassium chloride 10 MEQ CR tablet Duplicate order    PARoxetine (Paxil) 10 MG tablet     benazepril (LOTENSIN) 10 MG tablet

## 2024-06-12 NOTE — TELEPHONE ENCOUNTER
Caller: [facility], [place] - [address] [phone] [facility] [phone] FX    Relationship: Pharmacy    Best call back number: [phone]     Requested Prescriptions:   Requested Prescriptions     Pending Prescriptions Disp Refills    Diclofenac Sodium (VOLTAREN) 1 % gel gel 100 g 2     Sig: Apply 4 g topically to the appropriate area as directed 3 (Three) Times a Day.    PARoxetine (Paxil) 20 MG tablet 30 tablet 1     Sig: Take 1 tablet by mouth Every Morning.        Pharmacy where request should be sent: [facility], [place] - [address] [phone] [facility] [phone] FX     Last office visit with prescribing clinician: 5/23/2024   Last telemedicine visit with prescribing clinician: Visit date not found   Next office visit with prescribing clinician: 7/15/2024     Does the patient have less than a 3 day supply:  [] Yes  [] No    Would you like a call back once the refill request has been completed: [] Yes [] No    If the office needs to give you a call back, can they leave a voicemail: [] Yes [] No    Kaylene Aguilar Rep   06/12/24 14:57 EDT

## 2024-06-13 RX ORDER — PAROXETINE HYDROCHLORIDE 20 MG/1
20 TABLET, FILM COATED ORAL EVERY MORNING
Qty: 30 TABLET | Refills: 1 | Status: SHIPPED | OUTPATIENT
Start: 2024-06-13

## 2024-06-19 RX ORDER — CELECOXIB 100 MG/1
100 CAPSULE ORAL 2 TIMES DAILY PRN
Qty: 30 CAPSULE | Refills: 10 | Status: SHIPPED | OUTPATIENT
Start: 2024-06-19

## 2024-07-29 RX ORDER — PAROXETINE HYDROCHLORIDE 20 MG/1
20 TABLET, FILM COATED ORAL EVERY MORNING
Qty: 30 TABLET | Refills: 0 | Status: SHIPPED | OUTPATIENT
Start: 2024-07-29

## 2024-07-31 NOTE — TELEPHONE ENCOUNTER
I called the patient and left a voicemail to call the office back.    RELAY:  Per Dr Elizalde:  Refill for Paxil 20 mg was sent to the pharmacy with a 30 day supply. Patient needs to make a follow up appointment before more refills can be sent to the pharmacy. Thank you.

## 2024-08-02 RX ORDER — PAROXETINE HYDROCHLORIDE 20 MG/1
20 TABLET, FILM COATED ORAL EVERY MORNING
Qty: 30 TABLET | Refills: 10 | OUTPATIENT
Start: 2024-08-02

## 2024-10-02 ENCOUNTER — TELEPHONE (OUTPATIENT)
Dept: INTERNAL MEDICINE | Facility: CLINIC | Age: 49
End: 2024-10-02
Payer: COMMERCIAL

## 2024-10-02 NOTE — TELEPHONE ENCOUNTER
Caller: Ana Reddy    Relationship to patient: Self    Best call back number: [phone]     Patient is needing: PATIENT IS COMPLETELY OUT OF PAXIL MEDICATION.

## 2024-10-02 NOTE — TELEPHONE ENCOUNTER
Caller: Ana Reddy     Relationship: SELF    Best call back number: [phone]     What is your medical concern? PATIENT CONCERNED OVER LITTLE PATCHES SHOWING UP ON HER SKIN THAT LOOK LIKE SMALL CLUSTERS OF BLOOD BLISTERS. PATIENT STATES THAT THEY SHOW UP ONLY WHEN SHE'S REALLY STRESSED OUT AND THEN THEY GO AWAY. PATIENT DID TAKE A PICTURE OF THE PATCHES TO SHOW DR. MACIAS AT HER NEXT APPOINTMENT ON 10.18.2024. PATIENT STATES THAT HER MOTHER AND GRANDMOTHER ALSO HAD THE SAME ISSUE BUT DIDN'T DEVELOP UNTIL THEY WERE MUCH OLDER THAN SHE IS NOW.     How long has this issue been going on? 2 OR 3 WEEKS, BUT GOING ON FOR THE PAST YEAR WHERE IT COMES AND GOES.    Is your provider already aware of this issue? NO    Have you been treated for this issue? NO - PATIENT STATES THAT HER MOTHER HAD KIDNEY DISEASE AS WELL AND IS CONCERNED THAT THIS MAY BE A CONNECTED SYMPTOM. PLEASE ADVISE.

## 2024-10-02 NOTE — TELEPHONE ENCOUNTER
Caller: Ana Reddy    Relationship to patient: Self    Best call back number: [phone]     Patient is needing: PATIENT CALLING TO ADVISE THAT SHE IS COMPLETELY OUT OF LISINOPRIL MEDICATION.

## 2024-10-02 NOTE — TELEPHONE ENCOUNTER
Medium   Drug-Drug: acetaminophen-codeine and PARoxetine   Patient stated she is out of medication.

## 2024-10-03 RX ORDER — LISINOPRIL 10 MG/1
10 TABLET ORAL DAILY
Qty: 90 TABLET | Refills: 0 | Status: SHIPPED | OUTPATIENT
Start: 2024-10-03

## 2024-10-03 RX ORDER — PAROXETINE 20 MG/1
20 TABLET, FILM COATED ORAL EVERY MORNING
Qty: 30 TABLET | Refills: 0 | Status: SHIPPED | OUTPATIENT
Start: 2024-10-03

## 2024-10-03 NOTE — TELEPHONE ENCOUNTER
We can certainly discuss this at her upcoming visit.   If she feels she needs to be seen sooner, she can be scheduled with any provider for an acute visit or be seen in Urgent Care.

## 2024-10-07 NOTE — TELEPHONE ENCOUNTER
Attempted to call pt, unable to leave voicemail due to mailbox being full. Pt has apt scheduled with provider 10/18, closing encounter.

## 2024-10-24 NOTE — ASSESSMENT & PLAN NOTE
BP well controlled today in clinic  Continue current management  
On statin, tolerating well  
Will increase Paxil dose  
no

## 2024-11-04 RX ORDER — PAROXETINE 20 MG/1
20 TABLET, FILM COATED ORAL EVERY MORNING
Qty: 30 TABLET | Refills: 0 | Status: SHIPPED | OUTPATIENT
Start: 2024-11-04

## 2024-11-14 ENCOUNTER — OFFICE VISIT (OUTPATIENT)
Dept: INTERNAL MEDICINE | Facility: CLINIC | Age: 49
End: 2024-11-14
Payer: COMMERCIAL

## 2024-11-14 VITALS
SYSTOLIC BLOOD PRESSURE: 116 MMHG | OXYGEN SATURATION: 98 % | TEMPERATURE: 97.6 F | BODY MASS INDEX: 45.52 KG/M2 | RESPIRATION RATE: 18 BRPM | HEIGHT: 61 IN | HEART RATE: 73 BPM | WEIGHT: 241.13 LBS | DIASTOLIC BLOOD PRESSURE: 74 MMHG

## 2024-11-14 DIAGNOSIS — N39.3 STRESS INCONTINENCE: Primary | ICD-10-CM

## 2024-11-14 DIAGNOSIS — E55.9 VITAMIN D DEFICIENCY: ICD-10-CM

## 2024-11-14 DIAGNOSIS — Z12.31 ENCOUNTER FOR SCREENING MAMMOGRAM FOR MALIGNANT NEOPLASM OF BREAST: ICD-10-CM

## 2024-11-14 DIAGNOSIS — I10 PRIMARY HYPERTENSION: ICD-10-CM

## 2024-11-14 DIAGNOSIS — E78.00 PURE HYPERCHOLESTEROLEMIA: ICD-10-CM

## 2024-11-14 DIAGNOSIS — R73.01 IMPAIRED FASTING GLUCOSE: ICD-10-CM

## 2024-11-14 DIAGNOSIS — Z12.11 SCREENING FOR COLON CANCER: ICD-10-CM

## 2024-11-14 DIAGNOSIS — R53.83 OTHER FATIGUE: ICD-10-CM

## 2024-11-14 LAB
25(OH)D3 SERPL-MCNC: 49.5 NG/ML (ref 30–100)
ALBUMIN SERPL-MCNC: 4.1 G/DL (ref 3.5–5.2)
ALBUMIN/GLOB SERPL: 1.4 G/DL
ALP SERPL-CCNC: 92 U/L (ref 39–117)
ALT SERPL W P-5'-P-CCNC: 17 U/L (ref 1–33)
ANION GAP SERPL CALCULATED.3IONS-SCNC: 12 MMOL/L (ref 5–15)
AST SERPL-CCNC: 17 U/L (ref 1–32)
BASOPHILS # BLD AUTO: 0.07 10*3/MM3 (ref 0–0.2)
BASOPHILS NFR BLD AUTO: 1 % (ref 0–1.5)
BILIRUB SERPL-MCNC: 0.3 MG/DL (ref 0–1.2)
BUN SERPL-MCNC: 19 MG/DL (ref 6–20)
BUN/CREAT SERPL: 26.4 (ref 7–25)
CALCIUM SPEC-SCNC: 9.4 MG/DL (ref 8.6–10.5)
CHLORIDE SERPL-SCNC: 100 MMOL/L (ref 98–107)
CHOLEST SERPL-MCNC: 159 MG/DL (ref 0–200)
CO2 SERPL-SCNC: 28 MMOL/L (ref 22–29)
CREAT SERPL-MCNC: 0.72 MG/DL (ref 0.57–1)
DEPRECATED RDW RBC AUTO: 45.3 FL (ref 37–54)
EGFRCR SERPLBLD CKD-EPI 2021: 102 ML/MIN/1.73
EOSINOPHIL # BLD AUTO: 0.37 10*3/MM3 (ref 0–0.4)
EOSINOPHIL NFR BLD AUTO: 5.2 % (ref 0.3–6.2)
ERYTHROCYTE [DISTWIDTH] IN BLOOD BY AUTOMATED COUNT: 14.2 % (ref 12.3–15.4)
FOLATE SERPL-MCNC: >20 NG/ML (ref 4.78–24.2)
GLOBULIN UR ELPH-MCNC: 3 GM/DL
GLUCOSE SERPL-MCNC: 96 MG/DL (ref 65–99)
HBA1C MFR BLD: 5.6 % (ref 4.8–5.6)
HCT VFR BLD AUTO: 36.3 % (ref 34–46.6)
HDLC SERPL-MCNC: 45 MG/DL (ref 40–60)
HGB BLD-MCNC: 12.4 G/DL (ref 12–15.9)
IMM GRANULOCYTES # BLD AUTO: 0.02 10*3/MM3 (ref 0–0.05)
IMM GRANULOCYTES NFR BLD AUTO: 0.3 % (ref 0–0.5)
IRON 24H UR-MRATE: 64 MCG/DL (ref 37–145)
IRON SATN MFR SERPL: 17 % (ref 20–50)
LDLC SERPL CALC-MCNC: 90 MG/DL (ref 0–100)
LDLC/HDLC SERPL: 1.93 {RATIO}
LYMPHOCYTES # BLD AUTO: 1.16 10*3/MM3 (ref 0.7–3.1)
LYMPHOCYTES NFR BLD AUTO: 16.3 % (ref 19.6–45.3)
MCH RBC QN AUTO: 30.1 PG (ref 26.6–33)
MCHC RBC AUTO-ENTMCNC: 34.2 G/DL (ref 31.5–35.7)
MCV RBC AUTO: 88.1 FL (ref 79–97)
MONOCYTES # BLD AUTO: 0.54 10*3/MM3 (ref 0.1–0.9)
MONOCYTES NFR BLD AUTO: 7.6 % (ref 5–12)
NEUTROPHILS NFR BLD AUTO: 4.94 10*3/MM3 (ref 1.7–7)
NEUTROPHILS NFR BLD AUTO: 69.6 % (ref 42.7–76)
NRBC BLD AUTO-RTO: 0 /100 WBC (ref 0–0.2)
PLATELET # BLD AUTO: 334 10*3/MM3 (ref 140–450)
PMV BLD AUTO: 11.5 FL (ref 6–12)
POTASSIUM SERPL-SCNC: 3.8 MMOL/L (ref 3.5–5.2)
PROT SERPL-MCNC: 7.1 G/DL (ref 6–8.5)
RBC # BLD AUTO: 4.12 10*6/MM3 (ref 3.77–5.28)
SODIUM SERPL-SCNC: 140 MMOL/L (ref 136–145)
TIBC SERPL-MCNC: 371 MCG/DL (ref 298–536)
TRANSFERRIN SERPL-MCNC: 249 MG/DL (ref 200–360)
TRIGL SERPL-MCNC: 136 MG/DL (ref 0–150)
TSH SERPL DL<=0.05 MIU/L-ACNC: 3.14 UIU/ML (ref 0.27–4.2)
VIT B12 BLD-MCNC: 993 PG/ML (ref 211–946)
VLDLC SERPL-MCNC: 24 MG/DL (ref 5–40)
WBC NRBC COR # BLD AUTO: 7.1 10*3/MM3 (ref 3.4–10.8)

## 2024-11-14 PROCEDURE — 84466 ASSAY OF TRANSFERRIN: CPT | Performed by: INTERNAL MEDICINE

## 2024-11-14 PROCEDURE — 80061 LIPID PANEL: CPT | Performed by: INTERNAL MEDICINE

## 2024-11-14 PROCEDURE — 82306 VITAMIN D 25 HYDROXY: CPT | Performed by: INTERNAL MEDICINE

## 2024-11-14 PROCEDURE — 83540 ASSAY OF IRON: CPT | Performed by: INTERNAL MEDICINE

## 2024-11-14 PROCEDURE — 82607 VITAMIN B-12: CPT | Performed by: INTERNAL MEDICINE

## 2024-11-14 PROCEDURE — 83036 HEMOGLOBIN GLYCOSYLATED A1C: CPT | Performed by: INTERNAL MEDICINE

## 2024-11-14 PROCEDURE — 82746 ASSAY OF FOLIC ACID SERUM: CPT | Performed by: INTERNAL MEDICINE

## 2024-11-14 PROCEDURE — 80050 GENERAL HEALTH PANEL: CPT | Performed by: INTERNAL MEDICINE

## 2024-11-14 RX ORDER — METHOCARBAMOL 500 MG/1
1 TABLET, FILM COATED ORAL 3 TIMES DAILY
COMMUNITY
Start: 2024-07-18

## 2024-11-19 RX ORDER — ERGOCALCIFEROL 1.25 MG/1
50000 CAPSULE, LIQUID FILLED ORAL WEEKLY
Qty: 30 CAPSULE | Refills: 2 | Status: SHIPPED | OUTPATIENT
Start: 2024-11-19

## 2024-11-19 RX ORDER — METFORMIN HYDROCHLORIDE 500 MG/1
500 TABLET, EXTENDED RELEASE ORAL
Qty: 90 TABLET | Refills: 2 | Status: SHIPPED | OUTPATIENT
Start: 2024-11-19

## 2024-11-19 RX ORDER — HYDROCHLOROTHIAZIDE 12.5 MG/1
12.5 CAPSULE ORAL DAILY
Qty: 90 CAPSULE | Refills: 2 | Status: SHIPPED | OUTPATIENT
Start: 2024-11-19

## 2024-11-19 RX ORDER — AMLODIPINE BESYLATE 10 MG/1
10 TABLET ORAL DAILY
Qty: 90 TABLET | Refills: 2 | Status: SHIPPED | OUTPATIENT
Start: 2024-11-19

## 2024-11-19 RX ORDER — LISINOPRIL 10 MG/1
10 TABLET ORAL DAILY
Qty: 90 TABLET | Refills: 0 | Status: SHIPPED | OUTPATIENT
Start: 2024-11-19

## 2024-11-19 RX ORDER — PAROXETINE 20 MG/1
20 TABLET, FILM COATED ORAL EVERY MORNING
Qty: 30 TABLET | Refills: 0 | OUTPATIENT
Start: 2024-11-19

## 2024-11-19 RX ORDER — POTASSIUM CHLORIDE 750 MG/1
10 TABLET, EXTENDED RELEASE ORAL DAILY
Qty: 30 TABLET | Refills: 10 | Status: SHIPPED | OUTPATIENT
Start: 2024-11-19

## 2024-11-19 RX ORDER — ATORVASTATIN CALCIUM 10 MG/1
10 TABLET, FILM COATED ORAL DAILY
Qty: 90 TABLET | Refills: 2 | Status: SHIPPED | OUTPATIENT
Start: 2024-11-19

## 2024-11-19 RX ORDER — CELECOXIB 100 MG/1
100 CAPSULE ORAL 2 TIMES DAILY PRN
Qty: 30 CAPSULE | Refills: 10 | Status: SHIPPED | OUTPATIENT
Start: 2024-11-19

## 2024-11-20 RX ORDER — PAROXETINE 20 MG/1
20 TABLET, FILM COATED ORAL EVERY MORNING
Qty: 30 TABLET | Refills: 0 | Status: SHIPPED | OUTPATIENT
Start: 2024-11-20

## 2024-11-22 NOTE — ASSESSMENT & PLAN NOTE
On statin, tolerating well. Checking follow up labs today    Orders:    CBC & Differential    Comprehensive Metabolic Panel    Hemoglobin A1c    Lipid Panel    Vitamin B12 & Folate

## 2024-11-22 NOTE — PROGRESS NOTES
"Chief Complaint  Knee Pain (Both knees having pain from arthritis, also starting Paxil she has had quite a bit of fatigue) and Rash (Rash on lower forearm area that is red and comes and goes that does not hurt or itch or anything )    Subjective      Ana Reddy is a 49 y.o. female who presents to [facility]   Presenting with bilateral knee pain.  She is on Celebrex but has noticed increased symptoms.    She is having significant fatigue.    She has noticed a rash on her lower forearm that comes and goes.  She denies any itching or pain with this.    States that she is having stress incontinence of urine.     Objective   Vital Signs:   Vitals:    11/14/24 1519   BP: 116/74   BP Location: Left arm   Patient Position: Sitting   Cuff Size: Adult   Pulse: 73   Resp: 18   Temp: 97.6 °F (36.4 °C)   TempSrc: Temporal   SpO2: 98%   Weight: 109 kg (241 lb 2 oz)   Height: 154.9 cm (61\")     Body mass index is 45.56 kg/m².    Wt Readings from Last 3 Encounters:   11/14/24 109 kg (241 lb 2 oz)   05/23/24 110 kg (242 lb)   01/17/24 108 kg (239 lb 2 oz)     BP Readings from Last 3 Encounters:   11/14/24 116/74   05/23/24 122/78   01/17/24 122/76       Health Maintenance   Topic Date Due    COLORECTAL CANCER SCREENING  Never done    Pneumococcal Vaccine 0-64 (1 of 2 - PCV) Never done    TDAP/TD VACCINES (1 - Tdap) Never done    MAMMOGRAM  Never done    ANNUAL PHYSICAL  Never done    PAP SMEAR  Never done    ZOSTER VACCINE (1 of 2) Never done    COVID-19 Vaccine (1 - 2024-25 season) Never done    INFLUENZA VACCINE  03/31/2025 (Originally 8/1/2024)    LIPID PANEL  11/14/2025    BMI FOLLOWUP  11/14/2025    HEPATITIS C SCREENING  Completed       Physical Exam  Vitals reviewed.   Constitutional:       Appearance: Normal appearance. She is well-developed.   HENT:      Head: Normocephalic and atraumatic.      Mouth/Throat:      Pharynx: No oropharyngeal exudate.   Eyes:      " Conjunctiva/sclera: Conjunctivae normal.      Pupils: Pupils are equal, round, and reactive to light.   Neck:      Thyroid: No thyromegaly or thyroid tenderness.   Cardiovascular:      Rate and Rhythm: Normal rate and regular rhythm.      Heart sounds: No murmur heard.     No friction rub. No gallop.   Pulmonary:      Effort: Pulmonary effort is normal.      Breath sounds: Normal breath sounds. No wheezing or rhonchi.   Lymphadenopathy:      Cervical: No cervical adenopathy.   Skin:     General: Skin is warm and dry.      Findings: Rash (single non-blanching red skin lesion on forearm.) present.   Neurological:      Mental Status: She is alert and oriented to person, place, and time.   Psychiatric:         Mood and Affect: Affect normal.          Result Review :  The following data was reviewed by: Chio Elizalde MD on 11/14/2024:  CMP          1/17/2024    11:26 11/14/2024    16:12   CMP   Glucose 93  96    BUN 26  19    Creatinine 0.64  0.72    EGFR 108.5  102.0    Sodium 137  140    Potassium 3.4  3.8    Chloride 98  100    Calcium 9.7  9.4    Total Protein 7.4  7.1    Albumin 4.6  4.1    Globulin 2.8  3.0    Total Bilirubin 0.5  0.3    Alkaline Phosphatase 74  92    AST (SGOT) 21  17    ALT (SGPT) 18  17    Albumin/Globulin Ratio 1.6  1.4    BUN/Creatinine Ratio 40.6  26.4    Anion Gap 12.8  12.0      CBC w/diff          1/17/2024    11:26 11/14/2024    16:12   CBC w/Diff   WBC 7.65  7.10    RBC 4.03  4.12    Hemoglobin 12.0  12.4    Hematocrit 35.4  36.3    MCV 87.8  88.1    MCH 29.8  30.1    MCHC 33.9  34.2    RDW 13.2  14.2    Platelets 326  334    Neutrophil Rel % 65.4  69.6    Immature Granulocyte Rel % 0.1  0.3    Lymphocyte Rel % 25.4  16.3    Monocyte Rel % 8.0  7.6    Eosinophil Rel % 0.4  5.2    Basophil Rel % 0.7  1.0      Lipid Panel          1/17/2024    11:26 11/14/2024    16:12   Lipid Panel   Total Cholesterol 147  159    Triglycerides 60  136    HDL Cholesterol 53  45    VLDL  Cholesterol 12  24    LDL Cholesterol  82  90    LDL/HDL Ratio 1.55  1.93      TSH          1/17/2024    11:26 11/14/2024    16:12   TSH   TSH 4.950  3.140      Most Recent A1C          11/14/2024    16:12   HGBA1C Most Recent   Hemoglobin A1C 5.60           Procedures          Assessment & Plan  Stress incontinence    Orders:    Ambulatory Referral to Physical Therapy for Evaluation & Treatment    Pure hypercholesterolemia  On statin, tolerating well. Checking follow up labs today    Orders:    CBC & Differential    Comprehensive Metabolic Panel    Hemoglobin A1c    Lipid Panel    Vitamin B12 & Folate    Primary hypertension  Well controlled in clinic today  Continue current management    Orders:    CBC & Differential    Comprehensive Metabolic Panel    Hemoglobin A1c    Lipid Panel    Vitamin B12 & Folate    TSH Rfx On Abnormal To Free T4    Impaired fasting glucose  Checking labs today       Vitamin D deficiency    Orders:    Vitamin D 25 hydroxy    Other fatigue    Orders:    Vitamin B12 & Folate    Iron Profile    TSH Rfx On Abnormal To Free T4    Encounter for screening mammogram for malignant neoplasm of breast    Orders:    Mammo Screening Digital Tomosynthesis Bilateral With CAD; Future    Screening for colon cancer    Orders:    Ambulatory Referral For Screening Colonoscopy                   FOLLOW UP  Return in about 3 months (around 2/14/2025) for Next scheduled follow up.  Patient was given instructions and counseling regarding her condition or for health maintenance advice. Please see specific information pulled into the AVS if appropriate.       Chio Elizalde MD  11/22/24  11:22 EST    CURRENT & DISCONTINUED MEDICATIONS  Current Outpatient Medications   Medication Instructions    acetaminophen (TYLENOL) 500 mg, Every 6 Hours PRN    acetaminophen-codeine (TYLENOL with CODEINE #3) 300-30 MG per tablet 1 tablet, Every 12 Hours Scheduled    amLODIPine (NORVASC) 10 mg, Oral, Daily     atorvastatin (LIPITOR) 10 mg, Oral, Daily    celecoxib (CELEBREX) 100 mg, Oral, 2 Times Daily PRN    Cetirizine HCl 10 mg, Oral, Daily    Diclofenac Sodium (VOLTAREN) 4 g, Topical, 3 Times Daily    ferrous sulfate 324 mg, Daily With Breakfast    gabapentin (NEURONTIN) 100 mg, Every Night at Bedtime    hydroCHLOROthiazide (MICROZIDE) 12.5 mg, Oral, Daily    lisinopril (PRINIVIL,ZESTRIL) 10 mg, Oral, Daily    metFORMIN ER (GLUCOPHAGE-XR) 500 mg, Oral, Daily With Breakfast    methocarbamol (ROBAXIN) 500 MG tablet 1 tablet, 3 times daily    PARoxetine (PAXIL) 20 mg, Oral, Every Morning    potassium chloride (KLOR-CON M10) 10 MEQ CR tablet 10 mEq, Oral, Daily    vitamin C (ASCORBIC ACID) 250 mg, Daily    vitamin D (ERGOCALCIFEROL) 50,000 Units, Oral, Weekly       Medications Discontinued During This Encounter   Medication Reason    gabapentin (NEURONTIN) 400 MG capsule Dose adjustment

## 2024-11-22 NOTE — ASSESSMENT & PLAN NOTE
Well controlled in clinic today  Continue current management    Orders:    CBC & Differential    Comprehensive Metabolic Panel    Hemoglobin A1c    Lipid Panel    Vitamin B12 & Folate    TSH Rfx On Abnormal To Free T4

## 2025-01-16 RX ORDER — PAROXETINE 20 MG/1
20 TABLET, FILM COATED ORAL EVERY MORNING
Qty: 30 TABLET | Refills: 0 | Status: SHIPPED | OUTPATIENT
Start: 2025-01-16 | End: 2025-01-17 | Stop reason: SDUPTHER

## 2025-01-17 RX ORDER — PAROXETINE 20 MG/1
20 TABLET, FILM COATED ORAL EVERY MORNING
Qty: 90 TABLET | Refills: 1 | Status: SHIPPED | OUTPATIENT
Start: 2025-01-17

## 2025-01-17 NOTE — TELEPHONE ENCOUNTER
Patient comment: Can I get more than one refill on this at the pharmacy it seems I run out 5 days  before my refill is refilled in the past couple months for some reason.     30 day supply sent to pharmacy yesterday.

## 2025-02-10 RX ORDER — PAROXETINE 20 MG/1
20 TABLET, FILM COATED ORAL EVERY MORNING
Qty: 90 TABLET | Refills: 1 | Status: SHIPPED | OUTPATIENT
Start: 2025-02-10

## 2025-02-19 RX ORDER — LISINOPRIL 10 MG/1
10 TABLET ORAL DAILY
Qty: 90 TABLET | Refills: 0 | Status: SHIPPED | OUTPATIENT
Start: 2025-02-19

## 2025-03-03 ENCOUNTER — TELEPHONE (OUTPATIENT)
Dept: INTERNAL MEDICINE | Facility: CLINIC | Age: 50
End: 2025-03-03
Payer: COMMERCIAL

## 2025-03-03 RX ORDER — MELOXICAM 7.5 MG/1
7.5 TABLET ORAL DAILY
Qty: 90 TABLET | Refills: 0 | Status: SHIPPED | OUTPATIENT
Start: 2025-03-03

## 2025-03-03 NOTE — TELEPHONE ENCOUNTER
Caller: Ana Reddy    Relationship: Self    Best call back number:     [phone]       Which medication are you concerned about: celecoxib (CeleBREX) 100 MG capsule     Who prescribed you this medication: KAITY MACIAS    When did you start taking this medication:     What are your concerns: TOO EXPENSIVE, WOULD LIKE FOR SOMETHING ELSE TO BE PRESCRIBED    PLEASE CALL AND ADVISE    How long have you had these concerns:

## 2025-03-03 NOTE — TELEPHONE ENCOUNTER
Noted.   Spoke with patient rely message, pt verbalized understanding.    Patients stated she thinks she is having a gout flare up on her LT foot, she stated she has some swelling on the top of the foot and very painful, she stated she wanted pcp to be aware.

## 2025-04-10 ENCOUNTER — OFFICE VISIT (OUTPATIENT)
Dept: INTERNAL MEDICINE | Facility: CLINIC | Age: 50
End: 2025-04-10
Payer: COMMERCIAL

## 2025-04-10 VITALS
DIASTOLIC BLOOD PRESSURE: 78 MMHG | WEIGHT: 242.6 LBS | TEMPERATURE: 97.2 F | SYSTOLIC BLOOD PRESSURE: 126 MMHG | OXYGEN SATURATION: 99 % | HEART RATE: 81 BPM | BODY MASS INDEX: 45.84 KG/M2

## 2025-04-10 DIAGNOSIS — T14.8XXA BRUISING: ICD-10-CM

## 2025-04-10 DIAGNOSIS — M79.672 LEFT FOOT PAIN: Primary | ICD-10-CM

## 2025-04-10 DIAGNOSIS — M54.16 LUMBAR RADICULOPATHY: ICD-10-CM

## 2025-04-10 DIAGNOSIS — G89.29 CHRONIC PAIN OF BOTH KNEES: ICD-10-CM

## 2025-04-10 DIAGNOSIS — M25.562 CHRONIC PAIN OF BOTH KNEES: ICD-10-CM

## 2025-04-10 DIAGNOSIS — R73.03 PREDIABETES: ICD-10-CM

## 2025-04-10 DIAGNOSIS — K52.9 CHRONIC DIARRHEA: ICD-10-CM

## 2025-04-10 DIAGNOSIS — M25.561 CHRONIC PAIN OF BOTH KNEES: ICD-10-CM

## 2025-04-10 LAB
ALBUMIN SERPL-MCNC: 4.3 G/DL (ref 3.5–5.2)
ALBUMIN/GLOB SERPL: 1.3 G/DL
ALP SERPL-CCNC: 98 U/L (ref 39–117)
ALT SERPL W P-5'-P-CCNC: 26 U/L (ref 1–33)
ANION GAP SERPL CALCULATED.3IONS-SCNC: 11.4 MMOL/L (ref 5–15)
AST SERPL-CCNC: 24 U/L (ref 1–32)
BASOPHILS # BLD AUTO: 0.04 10*3/MM3 (ref 0–0.2)
BASOPHILS NFR BLD AUTO: 0.7 % (ref 0–1.5)
BILIRUB SERPL-MCNC: 0.3 MG/DL (ref 0–1.2)
BUN SERPL-MCNC: 25 MG/DL (ref 6–20)
BUN/CREAT SERPL: 34.7 (ref 7–25)
CALCIUM SPEC-SCNC: 9.4 MG/DL (ref 8.6–10.5)
CHLORIDE SERPL-SCNC: 105 MMOL/L (ref 98–107)
CO2 SERPL-SCNC: 25.6 MMOL/L (ref 22–29)
CREAT SERPL-MCNC: 0.72 MG/DL (ref 0.57–1)
DEPRECATED RDW RBC AUTO: 43.7 FL (ref 37–54)
EGFRCR SERPLBLD CKD-EPI 2021: 101.4 ML/MIN/1.73
EOSINOPHIL # BLD AUTO: 0.43 10*3/MM3 (ref 0–0.4)
EOSINOPHIL NFR BLD AUTO: 7.2 % (ref 0.3–6.2)
ERYTHROCYTE [DISTWIDTH] IN BLOOD BY AUTOMATED COUNT: 13.6 % (ref 12.3–15.4)
FOLATE SERPL-MCNC: 19.9 NG/ML (ref 4.78–24.2)
GLOBULIN UR ELPH-MCNC: 3.4 GM/DL
GLUCOSE SERPL-MCNC: 101 MG/DL (ref 65–99)
HBA1C MFR BLD: 5.5 % (ref 4.8–5.6)
HCT VFR BLD AUTO: 34.8 % (ref 34–46.6)
HGB BLD-MCNC: 11.5 G/DL (ref 12–15.9)
IMM GRANULOCYTES # BLD AUTO: 0.03 10*3/MM3 (ref 0–0.05)
IMM GRANULOCYTES NFR BLD AUTO: 0.5 % (ref 0–0.5)
INR PPP: 0.95 (ref 0.86–1.15)
IRON 24H UR-MRATE: 51 MCG/DL (ref 37–145)
IRON SATN MFR SERPL: 14 % (ref 20–50)
LYMPHOCYTES # BLD AUTO: 1.18 10*3/MM3 (ref 0.7–3.1)
LYMPHOCYTES NFR BLD AUTO: 19.8 % (ref 19.6–45.3)
MCH RBC QN AUTO: 28.8 PG (ref 26.6–33)
MCHC RBC AUTO-ENTMCNC: 33 G/DL (ref 31.5–35.7)
MCV RBC AUTO: 87 FL (ref 79–97)
MONOCYTES # BLD AUTO: 0.59 10*3/MM3 (ref 0.1–0.9)
MONOCYTES NFR BLD AUTO: 9.9 % (ref 5–12)
NEUTROPHILS NFR BLD AUTO: 3.7 10*3/MM3 (ref 1.7–7)
NEUTROPHILS NFR BLD AUTO: 61.9 % (ref 42.7–76)
NRBC BLD AUTO-RTO: 0 /100 WBC (ref 0–0.2)
PLATELET # BLD AUTO: 346 10*3/MM3 (ref 140–450)
PMV BLD AUTO: 11.1 FL (ref 6–12)
POTASSIUM SERPL-SCNC: 3.8 MMOL/L (ref 3.5–5.2)
PROT SERPL-MCNC: 7.7 G/DL (ref 6–8.5)
PROTHROMBIN TIME: 13.1 SECONDS (ref 11.8–14.9)
RBC # BLD AUTO: 4 10*6/MM3 (ref 3.77–5.28)
SODIUM SERPL-SCNC: 142 MMOL/L (ref 136–145)
TIBC SERPL-MCNC: 368 MCG/DL (ref 298–536)
TRANSFERRIN SERPL-MCNC: 247 MG/DL (ref 200–360)
VIT B12 BLD-MCNC: 962 PG/ML (ref 211–946)
WBC NRBC COR # BLD AUTO: 5.97 10*3/MM3 (ref 3.4–10.8)

## 2025-04-10 PROCEDURE — 85610 PROTHROMBIN TIME: CPT | Performed by: NURSE PRACTITIONER

## 2025-04-10 PROCEDURE — 83540 ASSAY OF IRON: CPT | Performed by: NURSE PRACTITIONER

## 2025-04-10 PROCEDURE — 83036 HEMOGLOBIN GLYCOSYLATED A1C: CPT | Performed by: NURSE PRACTITIONER

## 2025-04-10 PROCEDURE — 85025 COMPLETE CBC W/AUTO DIFF WBC: CPT | Performed by: NURSE PRACTITIONER

## 2025-04-10 PROCEDURE — 82607 VITAMIN B-12: CPT | Performed by: NURSE PRACTITIONER

## 2025-04-10 PROCEDURE — 84466 ASSAY OF TRANSFERRIN: CPT | Performed by: NURSE PRACTITIONER

## 2025-04-10 PROCEDURE — 80053 COMPREHEN METABOLIC PANEL: CPT | Performed by: NURSE PRACTITIONER

## 2025-04-10 PROCEDURE — 82746 ASSAY OF FOLIC ACID SERUM: CPT | Performed by: NURSE PRACTITIONER

## 2025-04-10 RX ORDER — DICYCLOMINE HYDROCHLORIDE 10 MG/1
10 CAPSULE ORAL
Qty: 60 CAPSULE | Refills: 0 | Status: SHIPPED | OUTPATIENT
Start: 2025-04-10

## 2025-04-10 NOTE — PROGRESS NOTES
Chief Complaint  Foot Pain (Needing referral to pain management. Left foot pain, notes at end of shift at work, her foot will swell and in lots of pain. ) and Bleeding/Bruising (Notes she is bruising very easily. )      Subjective      History of Present Illness  The patient is a 50-year-old female with a history of chronic foot pain, seeking a referral for pain management. She is currently under pain management for lumbar radiculopathy and reports increased bruising.    The patient describes intermittent foot pain, which she attributes to prolonged standing during her 10-hour shifts as an  at [employer]. The pain is primarily localized to the dorsum of the left foot, with occasional radiation to the posterior aspect, heel, and ankle. There was one instance where the pain was isolated to the hallux. The pain alleviates with rest but recurs following exertion or spontaneously.     The patient also has a history of chronic knee pain, for which she received two cortisone injections without relief. She was scheduled for hyaluronic acid injections but did not pursue them. She is seeking a handicap sticker due to the severity of her knee pain.    The patient was discharged from her previous pain management provider this morning following a disputed positive methamphetamine test. She admits to occasional marijuana use for chronic back pain and recent use of cough medicine. She is seeking a new pain management provider closer to her residence.    She reports unexplained bruising, often without recollection of trauma, and does not take B12 supplements regularly.    The patient is experiencing postprandial diarrhea, which resolves after a few days, and is seeking an alternative to Imodium.  She reports this is chronic and not significantly changed from baseline.  He would like to try Bentyl.    She is currently undergoing therapy for grief following the passing of her mother 1 year ago.    She has also noticed  increased thirst over the last few weeks to months.  Previously she has been diagnosed with prediabetes and would like to have A1c checked today.         Objective   Vital Signs:   Vitals:    04/10/25 1133   BP: 126/78   BP Location: Right arm   Patient Position: Sitting   Cuff Size: Adult   Pulse: 81   Temp: 97.2 °F (36.2 °C)   TempSrc: Temporal   SpO2: 99%   Weight: 110 kg (242 lb 9.6 oz)     Body mass index is 45.84 kg/m².    Wt Readings from Last 3 Encounters:   04/10/25 110 kg (242 lb 9.6 oz)   11/14/24 109 kg (241 lb 2 oz)   05/23/24 110 kg (242 lb)     BP Readings from Last 3 Encounters:   04/10/25 126/78   11/14/24 116/74   05/23/24 122/78       Health Maintenance   Topic Date Due    Pneumococcal Vaccine 50+ (1 of 2 - PCV) Never done    TDAP/TD VACCINES (1 - Tdap) Never done    PAP SMEAR  Never done    MAMMOGRAM  Never done    COLORECTAL CANCER SCREENING  Never done    ANNUAL PHYSICAL  Never done    ZOSTER VACCINE (1 of 2) Never done    COVID-19 Vaccine (1 - 2024-25 season) Never done    INFLUENZA VACCINE  07/01/2025    LIPID PANEL  11/14/2025    HEPATITIS C SCREENING  Completed       Physical Exam  Vitals and nursing note reviewed.   Constitutional:       General: She is not in acute distress.     Appearance: Normal appearance.   HENT:      Head: Normocephalic and atraumatic.      Right Ear: External ear normal.      Left Ear: External ear normal.      Nose: Nose normal.      Mouth/Throat:      Mouth: Mucous membranes are moist.   Eyes:      Conjunctiva/sclera: Conjunctivae normal.   Cardiovascular:      Rate and Rhythm: Normal rate and regular rhythm.      Pulses: Normal pulses.      Heart sounds: Normal heart sounds. No murmur heard.     No friction rub. No gallop.   Pulmonary:      Effort: Pulmonary effort is normal. No respiratory distress.      Breath sounds: No wheezing, rhonchi or rales.   Musculoskeletal:      Cervical back: Neck supple.      Right lower leg: No edema.      Left lower leg: No edema.    Skin:     General: Skin is warm and dry.   Neurological:      General: No focal deficit present.      Mental Status: She is alert and oriented to person, place, and time.   Psychiatric:         Mood and Affect: Mood normal.         Behavior: Behavior normal.        Physical Exam        Result Review :  The following data was reviewed by: MARGARET Stafford on 04/10/2025:         Results              Procedures            Assessment & Plan  Left foot pain    Orders:    XR Foot 3+ View Left (In Office)    Ambulatory Referral to Podiatry    Bruising    Orders:    CBC Auto Differential    Comprehensive Metabolic Panel    Protime-INR    Folate    Iron Profile    Vitamin B12    Lumbar radiculopathy    Orders:    Ambulatory Referral to Pain Management    Chronic diarrhea         Chronic pain of both knees         Prediabetes    Orders:    Hemoglobin A1c         Assessment & Plan  1. Chronic foot pain:  - Ordered x-ray of left foot to rule out stress fracture or other abnormalities  - Referral to podiatry for further evaluation and potential use of inserts    2. Lumbar radiculopathy:  - Referral to new pain management specialist closer to home    3. Increased bruising:  - Lab work to assess blood counts, liver function, B12, and folate levels    4. Chronic diarrhea:  - Prescribed dicyclomine up to 4 times daily  - Advised to monitor for drowsiness and adjust timing of doses    5. Depression:  - Undergoing therapy for grief following mother's passing    6. Chronic knee pain:  - Advised to obtain form for handicap sticker from appropriate authority, to be completed by our office    7.  Diabetes  - Checking A1c today  - Instructed patient to follow-up with PCP    Patient or patient representative verbalized consent for the use of Ambient Listening during the visit with  MARGARET Stafford for chart documentation. 4/10/2025  12:43 EDT      FOLLOW UP  Return in about 6 weeks (around 5/22/2025).  Patient was given  instructions and counseling regarding her condition or for health maintenance advice. Please see specific information pulled into the AVS if appropriate.     Kelly SHAW Edgard, APRN  04/10/25  12:45 EDT    CURRENT & DISCONTINUED MEDICATIONS  Current Outpatient Medications   Medication Instructions    acetaminophen (TYLENOL) 500 mg, Every 6 Hours PRN    acetaminophen-codeine (TYLENOL with CODEINE #3) 300-30 MG per tablet 1 tablet, Every 12 Hours Scheduled    amLODIPine (NORVASC) 10 mg, Oral, Daily    atorvastatin (LIPITOR) 10 mg, Oral, Daily    Diclofenac Sodium (VOLTAREN) 4 g, Topical, 3 Times Daily    dicyclomine (BENTYL) 10 mg, Oral, 4 Times Daily Before Meals & Nightly    ferrous sulfate 324 mg, Daily With Breakfast    gabapentin (NEURONTIN) 100 mg, Every Night at Bedtime    hydroCHLOROthiazide (MICROZIDE) 12.5 mg, Oral, Daily    lisinopril (PRINIVIL,ZESTRIL) 10 mg, Oral, Daily    meloxicam (MOBIC) 7.5 mg, Oral, Daily    metFORMIN ER (GLUCOPHAGE-XR) 500 mg, Oral, Daily With Breakfast    methocarbamol (ROBAXIN) 500 MG tablet 1 tablet, 3 times daily    PARoxetine (PAXIL) 20 mg, Oral, Every Morning    potassium chloride (KLOR-CON M10) 10 MEQ CR tablet 10 mEq, Oral, Daily    vitamin C (ASCORBIC ACID) 250 mg, Daily    vitamin D (ERGOCALCIFEROL) 50,000 Units, Oral, Weekly       Medications Discontinued During This Encounter   Medication Reason    Cetirizine HCl 10 MG capsule

## 2025-04-26 ENCOUNTER — HOSPITAL ENCOUNTER (OUTPATIENT)
Dept: OTHER | Facility: HOSPITAL | Age: 50
Discharge: HOME | End: 2025-04-26

## 2025-05-01 ENCOUNTER — TELEPHONE (OUTPATIENT)
Dept: INTERNAL MEDICINE | Facility: CLINIC | Age: 50
End: 2025-05-01
Payer: COMMERCIAL

## 2025-05-01 NOTE — TELEPHONE ENCOUNTER
Caller: Ana Reddy    Relationship: Self    Best call back number:     [phone]       What was the call regarding: PATIENT STATES THAT THE STAFF AT [facility] WERE VERY RUDE AND SHE WOULD LIKE TO BE REFERRED SOMEWHERE ELSE.

## 2025-05-01 NOTE — TELEPHONE ENCOUNTER
Caller: Ana Reddy    Relationship: Self    Best call back number:    [phone]        What is the medical concern/diagnosis: KNEE PAIN IN BOTH KNEES    What specialty or service is being requested: ORTHO    Any additional details:

## 2025-05-02 NOTE — TELEPHONE ENCOUNTER
I can place referral for ortho.   It does look like she was previously seeing [facility] and then was referred to [facility]. Can you clarify why she switched? Those two are the ones in [facility]. Referral to another location would require travel to [place].Please see if she is agreeable to this before I place a new referral.

## 2025-06-16 RX ORDER — MELOXICAM 7.5 MG/1
7.5 TABLET ORAL DAILY
Qty: 90 TABLET | Refills: 0 | Status: SHIPPED | OUTPATIENT
Start: 2025-06-16

## 2025-07-21 RX ORDER — LISINOPRIL 10 MG/1
10 TABLET ORAL DAILY
Qty: 90 TABLET | Refills: 0 | Status: SHIPPED | OUTPATIENT
Start: 2025-07-21 | End: 2025-07-23 | Stop reason: SDUPTHER

## 2025-07-23 ENCOUNTER — OFFICE VISIT (OUTPATIENT)
Dept: INTERNAL MEDICINE | Facility: CLINIC | Age: 50
End: 2025-07-23
Payer: COMMERCIAL

## 2025-07-23 VITALS
RESPIRATION RATE: 18 BRPM | WEIGHT: 234.25 LBS | DIASTOLIC BLOOD PRESSURE: 70 MMHG | SYSTOLIC BLOOD PRESSURE: 118 MMHG | HEART RATE: 75 BPM | BODY MASS INDEX: 44.22 KG/M2 | OXYGEN SATURATION: 98 % | TEMPERATURE: 97.5 F | HEIGHT: 61 IN

## 2025-07-23 DIAGNOSIS — M25.561 CHRONIC PAIN OF BOTH KNEES: Primary | ICD-10-CM

## 2025-07-23 DIAGNOSIS — G89.29 CHRONIC PAIN OF BOTH KNEES: Primary | ICD-10-CM

## 2025-07-23 DIAGNOSIS — E55.9 VITAMIN D DEFICIENCY: ICD-10-CM

## 2025-07-23 DIAGNOSIS — Z12.31 ENCOUNTER FOR SCREENING MAMMOGRAM FOR MALIGNANT NEOPLASM OF BREAST: ICD-10-CM

## 2025-07-23 DIAGNOSIS — Z12.4 SCREENING FOR CERVICAL CANCER: ICD-10-CM

## 2025-07-23 DIAGNOSIS — M54.16 LUMBAR RADICULOPATHY: ICD-10-CM

## 2025-07-23 DIAGNOSIS — Z12.11 ENCOUNTER FOR SCREENING FOR MALIGNANT NEOPLASM OF COLON: ICD-10-CM

## 2025-07-23 DIAGNOSIS — M51.16 RADICULOPATHY DUE TO DISORDER OF INTERVERTEBRAL DISC OF LUMBAR SPINE: ICD-10-CM

## 2025-07-23 DIAGNOSIS — E78.00 PURE HYPERCHOLESTEROLEMIA: ICD-10-CM

## 2025-07-23 DIAGNOSIS — I10 PRIMARY HYPERTENSION: ICD-10-CM

## 2025-07-23 DIAGNOSIS — M25.562 CHRONIC PAIN OF BOTH KNEES: Primary | ICD-10-CM

## 2025-07-23 PROCEDURE — 99214 OFFICE O/P EST MOD 30 MIN: CPT | Performed by: INTERNAL MEDICINE

## 2025-07-23 RX ORDER — MULTIVIT WITH MINERALS/LUTEIN
250 TABLET ORAL DAILY
Qty: 90 TABLET | Refills: 1 | Status: SHIPPED | OUTPATIENT
Start: 2025-07-23

## 2025-07-23 RX ORDER — AMLODIPINE BESYLATE 10 MG/1
10 TABLET ORAL DAILY
Qty: 90 TABLET | Refills: 2 | Status: SHIPPED | OUTPATIENT
Start: 2025-07-23

## 2025-07-23 RX ORDER — HYDROCHLOROTHIAZIDE 12.5 MG/1
12.5 CAPSULE ORAL DAILY
Qty: 90 CAPSULE | Refills: 2 | Status: SHIPPED | OUTPATIENT
Start: 2025-07-23

## 2025-07-23 RX ORDER — POTASSIUM CHLORIDE 750 MG/1
10 TABLET, EXTENDED RELEASE ORAL DAILY
Qty: 30 TABLET | Refills: 10 | Status: SHIPPED | OUTPATIENT
Start: 2025-07-23

## 2025-07-23 RX ORDER — ERGOCALCIFEROL 1.25 MG/1
50000 CAPSULE, LIQUID FILLED ORAL WEEKLY
Qty: 30 CAPSULE | Refills: 2 | Status: SHIPPED | OUTPATIENT
Start: 2025-07-23

## 2025-07-23 RX ORDER — LISINOPRIL 10 MG/1
10 TABLET ORAL DAILY
Qty: 90 TABLET | Refills: 0 | Status: SHIPPED | OUTPATIENT
Start: 2025-07-23

## 2025-07-23 RX ORDER — METFORMIN HYDROCHLORIDE 500 MG/1
500 TABLET, EXTENDED RELEASE ORAL
Qty: 90 TABLET | Refills: 2 | Status: SHIPPED | OUTPATIENT
Start: 2025-07-23

## 2025-07-23 RX ORDER — FERROUS SULFATE 324(65)MG
324 TABLET, DELAYED RELEASE (ENTERIC COATED) ORAL
Qty: 90 TABLET | Refills: 1 | Status: SHIPPED | OUTPATIENT
Start: 2025-07-23

## 2025-07-23 RX ORDER — MELOXICAM 7.5 MG/1
7.5 TABLET ORAL DAILY
Qty: 90 TABLET | Refills: 0 | Status: SHIPPED | OUTPATIENT
Start: 2025-07-23

## 2025-07-23 RX ORDER — ATORVASTATIN CALCIUM 10 MG/1
10 TABLET, FILM COATED ORAL DAILY
Qty: 90 TABLET | Refills: 2 | Status: SHIPPED | OUTPATIENT
Start: 2025-07-23

## 2025-07-23 RX ORDER — PAROXETINE 20 MG/1
20 TABLET, FILM COATED ORAL EVERY MORNING
Qty: 90 TABLET | Refills: 1 | Status: SHIPPED | OUTPATIENT
Start: 2025-07-23

## 2025-07-23 RX ORDER — DICYCLOMINE HYDROCHLORIDE 10 MG/1
10 CAPSULE ORAL
Qty: 60 CAPSULE | Refills: 0 | Status: SHIPPED | OUTPATIENT
Start: 2025-07-23

## 2025-07-23 RX ORDER — NITROFURANTOIN 25; 75 MG/1; MG/1
1 CAPSULE ORAL EVERY 12 HOURS SCHEDULED
COMMUNITY
Start: 2025-07-19

## 2025-07-24 NOTE — PROGRESS NOTES
"Chief Complaint  Med Refill and referral  (Went to ED last Friday night not able to bend knee, both knees hurt but left the worse, and had a UTI and is still on medication for that, referral for ortho and pain management, also having nose bleeds regularly, BP has been normal every time she checks it. Fatigue, needing orders for a cane  )    Subjective      Ana Reddy is a 50 y.o. female who presents to [facility]     Presenting for follow up    Had recent ER visit for knee pain. Would like referral to ortho for ongoing management.  Needs order for a  cane to help with walking.     Needs referral to new pain management clinic. Willing to go to [place].     HTN:  well controlled today, doing well on medication, denies headache, chest pain, dizziness, vision changes    HLD: on statin, tolerating well, denies muscle pain/weakness    Objective   Vital Signs:   Vitals:    07/23/25 1047   BP: 118/70   BP Location: Left arm   Patient Position: Sitting   Cuff Size: Adult   Pulse: 75   Resp: 18   Temp: 97.5 °F (36.4 °C)   TempSrc: Temporal   SpO2: 98%   Weight: 106 kg (234 lb 4 oz)   Height: 154.9 cm (61\")     Body mass index is 44.26 kg/m².    Wt Readings from Last 3 Encounters:   07/23/25 106 kg (234 lb 4 oz)   04/10/25 110 kg (242 lb 9.6 oz)   11/14/24 109 kg (241 lb 2 oz)     BP Readings from Last 3 Encounters:   07/23/25 118/70   04/10/25 126/78   11/14/24 116/74       Health Maintenance   Topic Date Due    Pneumococcal Vaccine 50+ (1 of 2 - PCV) Never done    TDAP/TD VACCINES (1 - Tdap) Never done    PAP SMEAR  Never done    MAMMOGRAM  Never done    COLORECTAL CANCER SCREENING  Never done    ANNUAL PHYSICAL  Never done    ZOSTER VACCINE (1 of 2) Never done    COVID-19 Vaccine (1 - 2024-25 season) Never done    INFLUENZA VACCINE  10/01/2025    LIPID PANEL  11/14/2025    HEPATITIS C SCREENING  Completed       Physical Exam  Vitals reviewed.   Constitutional:       " Appearance: Normal appearance. She is well-developed.   HENT:      Head: Normocephalic and atraumatic.      Mouth/Throat:      Pharynx: No oropharyngeal exudate.   Eyes:      Conjunctiva/sclera: Conjunctivae normal.      Pupils: Pupils are equal, round, and reactive to light.   Neck:      Thyroid: No thyromegaly or thyroid tenderness.   Cardiovascular:      Rate and Rhythm: Normal rate and regular rhythm.      Heart sounds: No murmur heard.     No friction rub. No gallop.   Pulmonary:      Effort: Pulmonary effort is normal.      Breath sounds: Normal breath sounds. No wheezing or rhonchi.   Lymphadenopathy:      Cervical: No cervical adenopathy.   Skin:     General: Skin is warm and dry.   Neurological:      Mental Status: She is alert and oriented to person, place, and time.   Psychiatric:         Mood and Affect: Affect normal.          Result Review :  The following data was reviewed by: Chio Elizalde MD on 07/23/2025:  CMP          11/14/2024    16:12 4/10/2025    12:03   CMP   Glucose 96  101    BUN 19  25    Creatinine 0.72  0.72    EGFR 102.0  101.4    Sodium 140  142    Potassium 3.8  3.8    Chloride 100  105    Calcium 9.4  9.4    Total Protein 7.1  7.7    Albumin 4.1  4.3    Globulin 3.0  3.4    Total Bilirubin 0.3  0.3    Alkaline Phosphatase 92  98    AST (SGOT) 17  24    ALT (SGPT) 17  26    Albumin/Globulin Ratio 1.4  1.3    BUN/Creatinine Ratio 26.4  34.7    Anion Gap 12.0  11.4      CBC w/diff          11/14/2024    16:12 4/10/2025    12:03   CBC w/Diff   WBC 7.10  5.97    RBC 4.12  4.00    Hemoglobin 12.4  11.5    Hematocrit 36.3  34.8    MCV 88.1  87.0    MCH 30.1  28.8    MCHC 34.2  33.0    RDW 14.2  13.6    Platelets 334  346    Neutrophil Rel % 69.6  61.9    Immature Granulocyte Rel % 0.3  0.5    Lymphocyte Rel % 16.3  19.8    Monocyte Rel % 7.6  9.9    Eosinophil Rel % 5.2  7.2    Basophil Rel % 1.0  0.7      Lipid Panel          11/14/2024    16:12   Lipid Panel   Total  Cholesterol 159    Triglycerides 136    HDL Cholesterol 45    VLDL Cholesterol 24    LDL Cholesterol  90    LDL/HDL Ratio 1.93      TSH          11/14/2024    16:12   TSH   TSH 3.140      A1C Last 3 Results          11/14/2024    16:12 4/10/2025    12:03   HGBA1C Last 3 Results   Hemoglobin A1C 5.60  5.50             Procedures          Assessment & Plan  Chronic pain of both knees    Orders:    Cane  Single Tip Cane with Tip    Ambulatory Referral to Orthopedic Surgery    Lumbar radiculopathy    Orders:    Ambulatory Referral to Pain Management    Radiculopathy due to disorder of intervertebral disc of lumbar spine    Orders:    Ambulatory Referral to Pain Management    Encounter for screening mammogram for malignant neoplasm of breast    Orders:    Mammo Screening Digital Tomosynthesis Bilateral With CAD; Future    Encounter for screening for malignant neoplasm of colon    Orders:    Ambulatory Referral For Screening Colonoscopy    Screening for cervical cancer    Orders:    Ambulatory Referral to Obstetrics / Gynecology    Primary hypertension  Well controlled in clinic today  Continue current management    Orders:    CBC & Differential; Future    Comprehensive Metabolic Panel; Future    Hemoglobin A1c; Future    Lipid Panel; Future    TSH Rfx On Abnormal To Free T4; Future    Vitamin D deficiency    Orders:    Vitamin D 25 hydroxy    Pure hypercholesterolemia  On statin, tolerating well.   Checking follow up labs today    Orders:    CBC & Differential; Future    Comprehensive Metabolic Panel; Future    Hemoglobin A1c; Future    Lipid Panel; Future    TSH Rfx On Abnormal To Free T4; Future           FOLLOW UP  Return in about 3 months (around 10/23/2025) for Next scheduled follow up.  Patient was given instructions and counseling regarding her condition or for health maintenance advice. Please see specific information pulled into the AVS if appropriate.       Chio Elizalde MD  07/24/25  09:58  EDT    CURRENT & DISCONTINUED MEDICATIONS  Current Outpatient Medications   Medication Instructions    acetaminophen (TYLENOL) 500 mg, Every 6 Hours PRN    acetaminophen-codeine (TYLENOL with CODEINE #3) 300-30 MG per tablet 1 tablet, Every 12 Hours Scheduled    amLODIPine (NORVASC) 10 mg, Oral, Daily    atorvastatin (LIPITOR) 10 mg, Oral, Daily    Diclofenac Sodium (VOLTAREN) 4 g, Topical, 3 Times Daily    dicyclomine (BENTYL) 10 mg, Oral, 4 Times Daily Before Meals & Nightly    ferrous sulfate 324 mg, Oral, Daily With Breakfast    gabapentin (NEURONTIN) 100 mg, Every Night at Bedtime    hydroCHLOROthiazide (MICROZIDE) 12.5 mg, Oral, Daily    lisinopril (PRINIVIL,ZESTRIL) 10 mg, Oral, Daily    meloxicam (MOBIC) 7.5 mg, Oral, Daily    metFORMIN ER (GLUCOPHAGE-XR) 500 mg, Oral, Daily With Breakfast    methocarbamol (ROBAXIN) 500 MG tablet 1 tablet, 3 times daily    nitrofurantoin, macrocrystal-monohydrate, (MACROBID) 100 MG capsule 1 capsule, Every 12 Hours Scheduled    PARoxetine (PAXIL) 20 mg, Oral, Every Morning    potassium chloride (KLOR-CON M10) 10 MEQ CR tablet 10 mEq, Oral, Daily    vitamin C (ASCORBIC ACID) 250 mg, Oral, Daily    vitamin D (ERGOCALCIFEROL) 50,000 Units, Oral, Weekly       Medications Discontinued During This Encounter   Medication Reason    vitamin C (ASCORBIC ACID) 250 MG tablet Reorder    ferrous sulfate 324 (65 Fe) MG tablet delayed-release EC tablet Reorder    amLODIPine (NORVASC) 10 MG tablet Reorder    atorvastatin (LIPITOR) 10 MG tablet Reorder    hydroCHLOROthiazide (MICROZIDE) 12.5 MG capsule Reorder    metFORMIN ER (GLUCOPHAGE-XR) 500 MG 24 hr tablet Reorder    potassium chloride (KLOR-CON M10) 10 MEQ CR tablet Reorder    vitamin D (ERGOCALCIFEROL) 1.25 MG (37234 UT) capsule capsule Reorder    PARoxetine (PAXIL) 20 MG tablet Reorder    dicyclomine (BENTYL) 10 MG capsule Reorder    meloxicam (MOBIC) 7.5 MG tablet Reorder    lisinopril (PRINIVIL,ZESTRIL) 10 MG tablet Reorder

## 2025-07-24 NOTE — ASSESSMENT & PLAN NOTE
On statin, tolerating well.   Checking follow up labs today    Orders:    CBC & Differential; Future    Comprehensive Metabolic Panel; Future    Hemoglobin A1c; Future    Lipid Panel; Future    TSH Rfx On Abnormal To Free T4; Future

## 2025-07-28 ENCOUNTER — TELEPHONE (OUTPATIENT)
Dept: GASTROENTEROLOGY | Facility: CLINIC | Age: 50
End: 2025-07-28
Payer: COMMERCIAL

## 2025-07-28 NOTE — TELEPHONE ENCOUNTER
Left voice message for patient to return call regarding a referral we received from Chio Elizalde for a colon screening. Not est. Deferring referral out for two days.

## 2025-07-29 ENCOUNTER — TELEPHONE (OUTPATIENT)
Dept: INTERNAL MEDICINE | Facility: CLINIC | Age: 50
End: 2025-07-29

## 2025-07-30 NOTE — TELEPHONE ENCOUNTER
Second attempt in trying to contact patient.Left voice message for patient to return call regarding a referral we received from Chio Elizalde for a colon screening. Not est. Deferring referral out for two days.